# Patient Record
Sex: MALE | Race: WHITE | NOT HISPANIC OR LATINO | Employment: OTHER | URBAN - METROPOLITAN AREA
[De-identification: names, ages, dates, MRNs, and addresses within clinical notes are randomized per-mention and may not be internally consistent; named-entity substitution may affect disease eponyms.]

---

## 2023-07-12 ENCOUNTER — APPOINTMENT (OUTPATIENT)
Dept: LAB | Facility: LAB | Age: 68
End: 2023-07-12
Payer: MEDICARE

## 2023-07-12 LAB
ALANINE AMINOTRANSFERASE (SGPT) (U/L) IN SER/PLAS: 16 U/L (ref 10–52)
ALBUMIN (G/DL) IN SER/PLAS: 3.8 G/DL (ref 3.4–5)
ALKALINE PHOSPHATASE (U/L) IN SER/PLAS: 63 U/L (ref 33–136)
ANION GAP IN SER/PLAS: 11 MMOL/L (ref 10–20)
ASPARTATE AMINOTRANSFERASE (SGOT) (U/L) IN SER/PLAS: 20 U/L (ref 9–39)
BILIRUBIN TOTAL (MG/DL) IN SER/PLAS: 1.4 MG/DL (ref 0–1.2)
CALCIUM (MG/DL) IN SER/PLAS: 8.6 MG/DL (ref 8.6–10.3)
CARBON DIOXIDE, TOTAL (MMOL/L) IN SER/PLAS: 27 MMOL/L (ref 21–32)
CHLORIDE (MMOL/L) IN SER/PLAS: 105 MMOL/L (ref 98–107)
CHOLESTEROL (MG/DL) IN SER/PLAS: 174 MG/DL (ref 0–199)
CHOLESTEROL IN HDL (MG/DL) IN SER/PLAS: 71.3 MG/DL
CHOLESTEROL/HDL RATIO: 2.4
CREATININE (MG/DL) IN SER/PLAS: 0.94 MG/DL (ref 0.5–1.3)
ERYTHROCYTE DISTRIBUTION WIDTH (RATIO) BY AUTOMATED COUNT: 13.4 % (ref 11.5–14.5)
ERYTHROCYTE MEAN CORPUSCULAR HEMOGLOBIN CONCENTRATION (G/DL) BY AUTOMATED: 34 G/DL (ref 32–36)
ERYTHROCYTE MEAN CORPUSCULAR VOLUME (FL) BY AUTOMATED COUNT: 98 FL (ref 80–100)
ERYTHROCYTES (10*6/UL) IN BLOOD BY AUTOMATED COUNT: 4.51 X10E12/L (ref 4.5–5.9)
ESTIMATED AVERAGE GLUCOSE FOR HBA1C: 103 MG/DL
GFR MALE: 89 ML/MIN/1.73M2
GLUCOSE (MG/DL) IN SER/PLAS: 91 MG/DL (ref 74–99)
HEMATOCRIT (%) IN BLOOD BY AUTOMATED COUNT: 44.1 % (ref 41–52)
HEMOGLOBIN (G/DL) IN BLOOD: 15 G/DL (ref 13.5–17.5)
HEMOGLOBIN A1C/HEMOGLOBIN TOTAL IN BLOOD: 5.2 %
LDL: 82 MG/DL (ref 0–99)
LEUKOCYTES (10*3/UL) IN BLOOD BY AUTOMATED COUNT: 3.1 X10E9/L (ref 4.4–11.3)
PLATELETS (10*3/UL) IN BLOOD AUTOMATED COUNT: 160 X10E9/L (ref 150–450)
POTASSIUM (MMOL/L) IN SER/PLAS: 3.4 MMOL/L (ref 3.5–5.3)
PROSTATE SPECIFIC AG (NG/ML) IN SER/PLAS: 0.4 NG/ML (ref 0–4)
PROTEIN TOTAL: 6.6 G/DL (ref 6.4–8.2)
SODIUM (MMOL/L) IN SER/PLAS: 140 MMOL/L (ref 136–145)
THYROTROPIN (MIU/L) IN SER/PLAS BY DETECTION LIMIT <= 0.05 MIU/L: 2.16 MIU/L (ref 0.44–3.98)
TRIGLYCERIDE (MG/DL) IN SER/PLAS: 104 MG/DL (ref 0–149)
UREA NITROGEN (MG/DL) IN SER/PLAS: 14 MG/DL (ref 6–23)
VLDL: 21 MG/DL (ref 0–40)

## 2023-07-19 LAB
ALANINE AMINOTRANSFERASE (SGPT) (U/L) IN SER/PLAS: 16 U/L (ref 10–52)
ALBUMIN (G/DL) IN SER/PLAS: 3.9 G/DL (ref 3.4–5)
ALKALINE PHOSPHATASE (U/L) IN SER/PLAS: 52 U/L (ref 33–136)
ANION GAP IN SER/PLAS: 11 MMOL/L (ref 10–20)
ASPARTATE AMINOTRANSFERASE (SGOT) (U/L) IN SER/PLAS: 22 U/L (ref 9–39)
BASOPHILS (10*3/UL) IN BLOOD BY AUTOMATED COUNT: 0.08 X10E9/L (ref 0–0.1)
BASOPHILS/100 LEUKOCYTES IN BLOOD BY AUTOMATED COUNT: 2.3 % (ref 0–2)
BILIRUBIN TOTAL (MG/DL) IN SER/PLAS: 0.8 MG/DL (ref 0–1.2)
CALCIUM (MG/DL) IN SER/PLAS: 8.7 MG/DL (ref 8.6–10.3)
CARBON DIOXIDE, TOTAL (MMOL/L) IN SER/PLAS: 29 MMOL/L (ref 21–32)
CHLORIDE (MMOL/L) IN SER/PLAS: 104 MMOL/L (ref 98–107)
CREATININE (MG/DL) IN SER/PLAS: 0.86 MG/DL (ref 0.5–1.3)
EOSINOPHILS (10*3/UL) IN BLOOD BY AUTOMATED COUNT: 0.08 X10E9/L (ref 0–0.7)
EOSINOPHILS/100 LEUKOCYTES IN BLOOD BY AUTOMATED COUNT: 2.3 % (ref 0–6)
ERYTHROCYTE DISTRIBUTION WIDTH (RATIO) BY AUTOMATED COUNT: 13.1 % (ref 11.5–14.5)
ERYTHROCYTE MEAN CORPUSCULAR HEMOGLOBIN CONCENTRATION (G/DL) BY AUTOMATED: 33.6 G/DL (ref 32–36)
ERYTHROCYTE MEAN CORPUSCULAR VOLUME (FL) BY AUTOMATED COUNT: 97 FL (ref 80–100)
ERYTHROCYTES (10*6/UL) IN BLOOD BY AUTOMATED COUNT: 4.39 X10E12/L (ref 4.5–5.9)
GFR MALE: >90 ML/MIN/1.73M2
GLUCOSE (MG/DL) IN SER/PLAS: 100 MG/DL (ref 74–99)
HEMATOCRIT (%) IN BLOOD BY AUTOMATED COUNT: 42.8 % (ref 41–52)
HEMOGLOBIN (G/DL) IN BLOOD: 14.4 G/DL (ref 13.5–17.5)
IMMATURE GRANULOCYTES/100 LEUKOCYTES IN BLOOD BY AUTOMATED COUNT: 0.3 % (ref 0–0.9)
LEUKOCYTES (10*3/UL) IN BLOOD BY AUTOMATED COUNT: 3.6 X10E9/L (ref 4.4–11.3)
LYMPHOCYTES (10*3/UL) IN BLOOD BY AUTOMATED COUNT: 0.74 X10E9/L (ref 1.2–4.8)
LYMPHOCYTES/100 LEUKOCYTES IN BLOOD BY AUTOMATED COUNT: 20.8 % (ref 13–44)
MONOCYTES (10*3/UL) IN BLOOD BY AUTOMATED COUNT: 0.48 X10E9/L (ref 0.1–1)
MONOCYTES/100 LEUKOCYTES IN BLOOD BY AUTOMATED COUNT: 13.5 % (ref 2–10)
NEUTROPHILS (10*3/UL) IN BLOOD BY AUTOMATED COUNT: 2.16 X10E9/L (ref 1.2–7.7)
NEUTROPHILS/100 LEUKOCYTES IN BLOOD BY AUTOMATED COUNT: 60.8 % (ref 40–80)
PLATELETS (10*3/UL) IN BLOOD AUTOMATED COUNT: 127 X10E9/L (ref 150–450)
POTASSIUM (MMOL/L) IN SER/PLAS: 3.5 MMOL/L (ref 3.5–5.3)
PROTEIN TOTAL: 6.7 G/DL (ref 6.4–8.2)
SODIUM (MMOL/L) IN SER/PLAS: 140 MMOL/L (ref 136–145)
UREA NITROGEN (MG/DL) IN SER/PLAS: 13 MG/DL (ref 6–23)

## 2023-07-20 LAB
IGA (MG/DL) IN SER/PLAS: 67 MG/DL (ref 70–400)
IGG (MG/DL) IN SER/PLAS: 1860 MG/DL (ref 700–1600)
IGM (MG/DL) IN SER/PLAS: 40 MG/DL (ref 40–230)
IMMUNOGLOBULIN LIGHT CHAINS KAPPA/LAMBDA (MASS RATIO) IN SERUM: 1.66 (ref 0.26–1.65)
IMMUNOGLOBULIN LIGHT CHAINS.KAPPA (MG/DL) IN SERUM: 1.84 MG/DL (ref 0.33–1.94)
IMMUNOGLOBULIN LIGHT CHAINS.LAMBDA (MG/DL) IN SERUM: 1.11 MG/DL (ref 0.57–2.63)

## 2023-07-23 LAB
ALBUMIN ELP: 3.9 G/DL (ref 3.4–5)
ALPHA 1: 0.2 G/DL (ref 0.2–0.6)
ALPHA 2: 0.5 G/DL (ref 0.4–1.1)
BETA: 0.5 G/DL (ref 0.5–1.2)
GAMMA GLOBULIN: 1.6 G/DL (ref 0.5–1.4)
M-PROTEIN 1: 1 G/DL
PATH REVIEW-SERUM PROTEIN ELECTROPHORESIS: NORMAL
PROTEIN ELECTROPHORESIS INTERPRETATION: ABNORMAL
PROTEIN TOTAL: 6.7 G/DL (ref 6.4–8.2)

## 2023-07-24 LAB
ALANINE AMINOTRANSFERASE (SGPT) (U/L) IN SER/PLAS: NORMAL
ALBUMIN (G/DL) IN SER/PLAS: NORMAL
ALBUMIN ELP: NORMAL
ALKALINE PHOSPHATASE (U/L) IN SER/PLAS: NORMAL
ALPHA 1: NORMAL
ALPHA 2: NORMAL
ANION GAP IN SER/PLAS: NORMAL
ASPARTATE AMINOTRANSFERASE (SGOT) (U/L) IN SER/PLAS: NORMAL
BASOPHILS (10*3/UL) IN BLOOD BY AUTOMATED COUNT: NORMAL
BASOPHILS/100 LEUKOCYTES IN BLOOD BY AUTOMATED COUNT: NORMAL
BETA: NORMAL
BILIRUBIN TOTAL (MG/DL) IN SER/PLAS: NORMAL
CALCIUM (MG/DL) IN SER/PLAS: NORMAL
CARBON DIOXIDE, TOTAL (MMOL/L) IN SER/PLAS: NORMAL
CHLORIDE (MMOL/L) IN SER/PLAS: NORMAL
CREATININE (MG/DL) IN SER/PLAS: NORMAL
EOSINOPHILS (10*3/UL) IN BLOOD BY AUTOMATED COUNT: NORMAL
EOSINOPHILS/100 LEUKOCYTES IN BLOOD BY AUTOMATED COUNT: NORMAL
ERYTHROCYTE DISTRIBUTION WIDTH (RATIO) BY AUTOMATED COUNT: NORMAL
ERYTHROCYTE MEAN CORPUSCULAR HEMOGLOBIN CONCENTRATION (G/DL) BY AUTOMATED: NORMAL
ERYTHROCYTE MEAN CORPUSCULAR VOLUME (FL) BY AUTOMATED COUNT: NORMAL
ERYTHROCYTES (10*6/UL) IN BLOOD BY AUTOMATED COUNT: NORMAL
GAMMA GLOBULIN: NORMAL
GFR FEMALE: NORMAL
GFR MALE: NORMAL
GLUCOSE (MG/DL) IN SER/PLAS: NORMAL
HEMATOCRIT (%) IN BLOOD BY AUTOMATED COUNT: NORMAL
HEMOGLOBIN (G/DL) IN BLOOD: NORMAL
IMMATURE GRANULOCYTES/100 LEUKOCYTES IN BLOOD BY AUTOMATED COUNT: NORMAL
IMMUNOGLOBULIN LIGHT CHAINS KAPPA/LAMBDA (MASS RATIO) IN SERUM: NORMAL
IMMUNOGLOBULIN LIGHT CHAINS.KAPPA (MG/DL) IN SERUM: NORMAL
IMMUNOGLOBULIN LIGHT CHAINS.LAMBDA (MG/DL) IN SERUM: NORMAL
LEUKOCYTES (10*3/UL) IN BLOOD BY AUTOMATED COUNT: NORMAL
LYMPHOCYTES (10*3/UL) IN BLOOD BY AUTOMATED COUNT: NORMAL
LYMPHOCYTES/100 LEUKOCYTES IN BLOOD BY AUTOMATED COUNT: NORMAL
MANUAL DIFFERENTIAL Y/N: NORMAL
MONOCYTES (10*3/UL) IN BLOOD BY AUTOMATED COUNT: NORMAL
MONOCYTES/100 LEUKOCYTES IN BLOOD BY AUTOMATED COUNT: NORMAL
NEUTROPHILS (10*3/UL) IN BLOOD BY AUTOMATED COUNT: NORMAL
NEUTROPHILS/100 LEUKOCYTES IN BLOOD BY AUTOMATED COUNT: NORMAL
NRBC (PER 100 WBCS) BY AUTOMATED COUNT: NORMAL
PATH REVIEW-SERUM PROTEIN ELECTROPHORESIS: NORMAL
PLATELETS (10*3/UL) IN BLOOD AUTOMATED COUNT: NORMAL
POTASSIUM (MMOL/L) IN SER/PLAS: NORMAL
PROTEIN ELECTROPHORESIS INTERPRETATION: NORMAL
PROTEIN TOTAL: NORMAL
PROTEIN TOTAL: NORMAL
SODIUM (MMOL/L) IN SER/PLAS: NORMAL
UREA NITROGEN (MG/DL) IN SER/PLAS: NORMAL

## 2023-07-26 LAB
C. DIFFICILE TOXIN, PCR: NORMAL
CAMPYLOBACTER GP: NORMAL
CLOSTRIDIUM DIFFICILE NAP 1 STRAIN (PRESUMPTIVE): NORMAL
NOROVIRUS GI/GII: NORMAL
ROTAVIRUS A: NORMAL
SALMONELLA SP.: NORMAL
SHIGA TOXIN 1: NORMAL
SHIGA TOXIN 2: NORMAL
SHIGELLA SP.: NORMAL
VIBRIO GRP.: NORMAL
YERSINIA ENTEROCOLITICA: NORMAL

## 2023-10-16 ENCOUNTER — TELEPHONE (OUTPATIENT)
Dept: HEMATOLOGY/ONCOLOGY | Facility: CLINIC | Age: 68
End: 2023-10-16
Payer: MEDICARE

## 2023-10-16 DIAGNOSIS — C90.00 MULTIPLE MYELOMA NOT HAVING ACHIEVED REMISSION (MULTI): Primary | ICD-10-CM

## 2023-10-16 RX ORDER — LENALIDOMIDE 5 MG/1
5 CAPSULE ORAL DAILY
COMMUNITY
Start: 2023-09-20 | End: 2023-10-16 | Stop reason: SDUPTHER

## 2023-10-16 RX ORDER — OMEPRAZOLE 20 MG/1
20 TABLET, DELAYED RELEASE ORAL
Qty: 30 TABLET | Refills: 3 | Status: SHIPPED | OUTPATIENT
Start: 2023-10-16 | End: 2024-01-12

## 2023-10-16 RX ORDER — OMEPRAZOLE 20 MG/1
20 TABLET, DELAYED RELEASE ORAL
COMMUNITY
End: 2023-10-16 | Stop reason: SDUPTHER

## 2023-10-16 RX ORDER — LENALIDOMIDE 5 MG/1
5 CAPSULE ORAL DAILY
Qty: 21 CAPSULE | Refills: 0 | Status: SHIPPED | OUTPATIENT
Start: 2023-10-16 | End: 2023-11-10 | Stop reason: SDUPTHER

## 2023-11-08 ENCOUNTER — TELEPHONE (OUTPATIENT)
Dept: HEMATOLOGY/ONCOLOGY | Facility: CLINIC | Age: 68
End: 2023-11-08
Payer: MEDICARE

## 2023-11-08 DIAGNOSIS — C90.00 MULTIPLE MYELOMA NOT HAVING ACHIEVED REMISSION (MULTI): ICD-10-CM

## 2023-11-08 NOTE — TELEPHONE ENCOUNTER
Pt called and told he is due to have survey on Friday and that we will do then.  He states he just wanted to get the process going.  Confirmed that this was appreciated and glad he called.

## 2023-11-08 NOTE — TELEPHONE ENCOUNTER
[facility] line called and pt is due for Survey on 11/10.  This will need to be done under Dr. Gomez.  This can be done with their assistance on the [facility] line.

## 2023-11-10 RX ORDER — LENALIDOMIDE 5 MG/1
5 CAPSULE ORAL DAILY
Qty: 21 CAPSULE | Refills: 0 | Status: SHIPPED | OUTPATIENT
Start: 2023-11-10 | End: 2023-12-01

## 2023-11-10 NOTE — TELEPHONE ENCOUNTER
[facility] center called and account under Dr. Gomez.  [facility] survey done, authorization number #[id number].

## 2023-11-15 PROBLEM — D22.5 MELANOCYTIC NEVI OF TRUNK: Status: ACTIVE | Noted: 2017-04-04

## 2023-11-15 PROBLEM — E87.6 HYPOKALEMIA: Status: ACTIVE | Noted: 2023-11-15

## 2023-11-15 PROBLEM — L57.0 ACTINIC KERATOSIS: Status: ACTIVE | Noted: 2017-04-04

## 2023-11-15 PROBLEM — L81.4 OTHER MELANIN HYPERPIGMENTATION: Status: ACTIVE | Noted: 2017-04-04

## 2023-11-15 PROBLEM — R09.02 HYPOXIA: Status: ACTIVE | Noted: 2023-11-15

## 2023-11-15 PROBLEM — Z85.828 PERSONAL HISTORY OF OTHER MALIGNANT NEOPLASM OF SKIN: Status: ACTIVE | Noted: 2017-04-04

## 2023-11-15 PROBLEM — Z86.19 HISTORY OF HERPES ZOSTER: Status: ACTIVE | Noted: 2023-11-15

## 2023-11-15 PROBLEM — C80.1 MALIGNANT NEOPLASM (MULTI): Status: ACTIVE | Noted: 2023-11-15

## 2023-11-15 PROBLEM — J18.9 PNEUMONIA: Status: ACTIVE | Noted: 2023-11-15

## 2023-11-15 PROBLEM — L85.3 XEROSIS CUTIS: Status: ACTIVE | Noted: 2017-04-04

## 2023-11-15 PROBLEM — J96.00 ACUTE RESPIRATORY FAILURE (MULTI): Status: ACTIVE | Noted: 2023-11-15

## 2023-11-15 PROBLEM — M54.16 LUMBAR RADICULITIS: Status: ACTIVE | Noted: 2022-06-15

## 2023-11-15 PROBLEM — M79.609 PAIN IN EXTREMITY: Status: ACTIVE | Noted: 2023-11-15

## 2023-11-15 PROBLEM — Z86.79 HISTORY OF HYPERTENSION: Status: ACTIVE | Noted: 2023-11-15

## 2023-11-15 PROBLEM — I10 ESSENTIAL (PRIMARY) HYPERTENSION: Status: ACTIVE | Noted: 2022-03-16

## 2023-11-15 PROBLEM — L82.1 SEBORRHEIC KERATOSIS: Status: ACTIVE | Noted: 2017-04-04

## 2023-11-15 PROBLEM — D18.00 ANGIOMA: Status: ACTIVE | Noted: 2017-04-04

## 2023-11-15 PROBLEM — M51.26 LUMBAR DISC HERNIATION: Status: ACTIVE | Noted: 2023-11-15

## 2023-11-15 RX ORDER — AMLODIPINE BESYLATE 10 MG/1
10 TABLET ORAL DAILY
COMMUNITY
End: 2024-06-10

## 2023-11-15 RX ORDER — ASPIRIN 81 MG/1
81 TABLET ORAL DAILY
COMMUNITY

## 2023-11-15 RX ORDER — CEPHALEXIN 500 MG/1
500 CAPSULE ORAL 2 TIMES DAILY
COMMUNITY
Start: 2023-10-02 | End: 2023-11-27 | Stop reason: ALTCHOICE

## 2023-11-15 RX ORDER — AMMONIUM LACTATE 12 G/100G
CREAM TOPICAL
COMMUNITY
Start: 2014-10-21 | End: 2023-11-27 | Stop reason: ALTCHOICE

## 2023-11-15 RX ORDER — POTASSIUM CHLORIDE 20 MEQ/1
20 TABLET, EXTENDED RELEASE ORAL
COMMUNITY
Start: 2017-11-11 | End: 2023-11-24

## 2023-11-15 RX ORDER — LENALIDOMIDE 10 MG/1
10 CAPSULE ORAL DAILY
COMMUNITY
Start: 2022-03-01 | End: 2023-12-08 | Stop reason: SDUPTHER

## 2023-11-15 RX ORDER — FINASTERIDE 5 MG/1
5 TABLET, FILM COATED ORAL DAILY
COMMUNITY

## 2023-11-15 RX ORDER — IBUPROFEN 800 MG/1
800 TABLET ORAL EVERY 6 HOURS PRN
COMMUNITY

## 2023-11-15 RX ORDER — DIPHENHYDRAMINE HCL 25 MG
25 TABLET ORAL DAILY PRN
COMMUNITY

## 2023-11-15 RX ORDER — MELOXICAM 7.5 MG/1
7.5 TABLET ORAL
COMMUNITY
Start: 2017-12-22

## 2023-11-15 RX ORDER — LOSARTAN POTASSIUM 100 MG/1
100 TABLET ORAL
COMMUNITY
Start: 2022-03-07 | End: 2024-03-07

## 2023-11-15 RX ORDER — LEVOTHYROXINE SODIUM 25 UG/1
25 TABLET ORAL DAILY
COMMUNITY
End: 2024-02-22

## 2023-11-15 RX ORDER — CHLORHEXIDINE GLUCONATE ORAL RINSE 1.2 MG/ML
SOLUTION DENTAL
COMMUNITY
Start: 2023-10-02 | End: 2023-11-27 | Stop reason: ALTCHOICE

## 2023-11-15 RX ORDER — HYDROCHLOROTHIAZIDE 25 MG/1
25 TABLET ORAL DAILY
COMMUNITY
End: 2023-12-20

## 2023-11-15 RX ORDER — OMEPRAZOLE 20 MG/1
20 CAPSULE, DELAYED RELEASE ORAL
COMMUNITY

## 2023-11-21 ENCOUNTER — LAB (OUTPATIENT)
Dept: LAB | Facility: LAB | Age: 68
End: 2023-11-21
Payer: MEDICARE

## 2023-11-21 DIAGNOSIS — C90.01 MULTIPLE MYELOMA IN REMISSION (MULTI): ICD-10-CM

## 2023-11-21 LAB
ALBUMIN SERPL BCP-MCNC: 4.2 G/DL (ref 3.4–5)
ALP SERPL-CCNC: 64 U/L (ref 33–136)
ALT SERPL W P-5'-P-CCNC: 17 U/L (ref 10–52)
ANION GAP SERPL CALC-SCNC: 12 MMOL/L (ref 10–20)
AST SERPL W P-5'-P-CCNC: 22 U/L (ref 9–39)
BASOPHILS # BLD AUTO: 0.1 X10*3/UL (ref 0–0.1)
BASOPHILS NFR BLD AUTO: 2.4 %
BILIRUB SERPL-MCNC: 1.1 MG/DL (ref 0–1.2)
BUN SERPL-MCNC: 14 MG/DL (ref 6–23)
CALCIUM SERPL-MCNC: 8.8 MG/DL (ref 8.6–10.3)
CHLORIDE SERPL-SCNC: 102 MMOL/L (ref 98–107)
CO2 SERPL-SCNC: 29 MMOL/L (ref 21–32)
CREAT SERPL-MCNC: 0.92 MG/DL (ref 0.5–1.3)
EOSINOPHIL # BLD AUTO: 0.11 X10*3/UL (ref 0–0.7)
EOSINOPHIL NFR BLD AUTO: 2.7 %
ERYTHROCYTE [DISTWIDTH] IN BLOOD BY AUTOMATED COUNT: 13.4 % (ref 11.5–14.5)
GFR SERPL CREATININE-BSD FRML MDRD: >90 ML/MIN/1.73M*2
GLUCOSE SERPL-MCNC: 75 MG/DL (ref 74–99)
HCT VFR BLD AUTO: 45.9 % (ref 41–52)
HGB BLD-MCNC: 15.3 G/DL (ref 13.5–17.5)
IMM GRANULOCYTES # BLD AUTO: 0.01 X10*3/UL (ref 0–0.7)
IMM GRANULOCYTES NFR BLD AUTO: 0.2 % (ref 0–0.9)
LYMPHOCYTES # BLD AUTO: 0.97 X10*3/UL (ref 1.2–4.8)
LYMPHOCYTES NFR BLD AUTO: 23.4 %
MCH RBC QN AUTO: 33.2 PG (ref 26–34)
MCHC RBC AUTO-ENTMCNC: 33.3 G/DL (ref 32–36)
MCV RBC AUTO: 100 FL (ref 80–100)
MONOCYTES # BLD AUTO: 0.63 X10*3/UL (ref 0.1–1)
MONOCYTES NFR BLD AUTO: 15.2 %
NEUTROPHILS # BLD AUTO: 2.33 X10*3/UL (ref 1.2–7.7)
NEUTROPHILS NFR BLD AUTO: 56.1 %
NRBC BLD-RTO: 0 /100 WBCS (ref 0–0)
PLATELET # BLD AUTO: 167 X10*3/UL (ref 150–450)
POTASSIUM SERPL-SCNC: 3.9 MMOL/L (ref 3.5–5.3)
PROT SERPL-MCNC: 6.9 G/DL (ref 6.4–8.2)
RBC # BLD AUTO: 4.61 X10*6/UL (ref 4.5–5.9)
SODIUM SERPL-SCNC: 139 MMOL/L (ref 136–145)
WBC # BLD AUTO: 4.2 X10*3/UL (ref 4.4–11.3)

## 2023-11-21 PROCEDURE — 80053 COMPREHEN METABOLIC PANEL: CPT

## 2023-11-21 PROCEDURE — 36415 COLL VENOUS BLD VENIPUNCTURE: CPT

## 2023-11-21 PROCEDURE — 85025 COMPLETE CBC W/AUTO DIFF WBC: CPT

## 2023-11-21 PROCEDURE — 84165 PROTEIN E-PHORESIS SERUM: CPT

## 2023-11-21 PROCEDURE — 84155 ASSAY OF PROTEIN SERUM: CPT

## 2023-11-21 PROCEDURE — 83521 IG LIGHT CHAINS FREE EACH: CPT

## 2023-11-22 LAB
KAPPA LC SERPL-MCNC: 1.75 MG/DL (ref 0.33–1.94)
KAPPA LC/LAMBDA SER: 1.73 {RATIO} (ref 0.26–1.65)
LAMBDA LC SERPL-MCNC: 1.01 MG/DL (ref 0.57–2.63)
PROT SERPL-MCNC: 7.2 G/DL (ref 6.4–8.2)

## 2023-11-23 DIAGNOSIS — E87.6 HYPOKALEMIA: ICD-10-CM

## 2023-11-23 LAB
ALBUMIN: 4.2 G/DL (ref 3.4–5)
ALPHA 1 GLOBULIN: 0.3 G/DL (ref 0.2–0.6)
ALPHA 2 GLOBULIN: 0.5 G/DL (ref 0.4–1.1)
BETA GLOBULIN: 0.6 G/DL (ref 0.5–1.2)
GAMMA GLOBULIN: 1.7 G/DL (ref 0.5–1.4)
M-PROTEIN 1: 1 G/DL
PATH REVIEW-SERUM PROTEIN ELECTROPHORESIS: ABNORMAL
PROTEIN ELECTROPHORESIS COMMENT: ABNORMAL

## 2023-11-24 RX ORDER — POTASSIUM CHLORIDE 1500 MG/1
30 TABLET, EXTENDED RELEASE ORAL DAILY
Qty: 45 TABLET | Refills: 11 | Status: SHIPPED | OUTPATIENT
Start: 2023-11-24

## 2023-11-27 ENCOUNTER — OFFICE VISIT (OUTPATIENT)
Dept: HEMATOLOGY/ONCOLOGY | Facility: CLINIC | Age: 68
End: 2023-11-27
Payer: MEDICARE

## 2023-11-27 VITALS
OXYGEN SATURATION: 95 % | DIASTOLIC BLOOD PRESSURE: 78 MMHG | WEIGHT: 219.8 LBS | SYSTOLIC BLOOD PRESSURE: 129 MMHG | TEMPERATURE: 98.2 F | RESPIRATION RATE: 17 BRPM | BODY MASS INDEX: 29.77 KG/M2 | HEART RATE: 59 BPM | HEIGHT: 72 IN

## 2023-11-27 DIAGNOSIS — C90.01 MULTIPLE MYELOMA IN REMISSION (MULTI): ICD-10-CM

## 2023-11-27 PROCEDURE — 1159F MED LIST DOCD IN RCRD: CPT | Performed by: PHYSICIAN ASSISTANT

## 2023-11-27 PROCEDURE — 3078F DIAST BP <80 MM HG: CPT | Performed by: PHYSICIAN ASSISTANT

## 2023-11-27 PROCEDURE — 1126F AMNT PAIN NOTED NONE PRSNT: CPT | Performed by: PHYSICIAN ASSISTANT

## 2023-11-27 PROCEDURE — 3074F SYST BP LT 130 MM HG: CPT | Performed by: PHYSICIAN ASSISTANT

## 2023-11-27 PROCEDURE — 1036F TOBACCO NON-USER: CPT | Performed by: PHYSICIAN ASSISTANT

## 2023-11-27 PROCEDURE — 99214 OFFICE O/P EST MOD 30 MIN: CPT | Performed by: PHYSICIAN ASSISTANT

## 2023-11-27 PROCEDURE — 99214 OFFICE O/P EST MOD 30 MIN: CPT | Mod: PO | Performed by: PHYSICIAN ASSISTANT

## 2023-11-27 ASSESSMENT — COLUMBIA-SUICIDE SEVERITY RATING SCALE - C-SSRS
6. HAVE YOU EVER DONE ANYTHING, STARTED TO DO ANYTHING, OR PREPARED TO DO ANYTHING TO END YOUR LIFE?: NO
1. IN THE PAST MONTH, HAVE YOU WISHED YOU WERE DEAD OR WISHED YOU COULD GO TO SLEEP AND NOT WAKE UP?: NO
2. HAVE YOU ACTUALLY HAD ANY THOUGHTS OF KILLING YOURSELF?: NO

## 2023-11-27 ASSESSMENT — PATIENT HEALTH QUESTIONNAIRE - PHQ9
1. LITTLE INTEREST OR PLEASURE IN DOING THINGS: NOT AT ALL
2. FEELING DOWN, DEPRESSED OR HOPELESS: NOT AT ALL
SUM OF ALL RESPONSES TO PHQ9 QUESTIONS 1 AND 2: 0

## 2023-11-27 ASSESSMENT — ENCOUNTER SYMPTOMS
DEPRESSION: 0
LOSS OF SENSATION IN FEET: 0
OCCASIONAL FEELINGS OF UNSTEADINESS: 0

## 2023-11-27 ASSESSMENT — PAIN SCALES - GENERAL: PAINLEVEL: 0-NO PAIN

## 2023-11-27 NOTE — PROGRESS NOTES
Patient Visit Information:   Visit Type: New Visit, Follow Up Visit     Cancer History:    Multiple Myeloma/Plasmacytoma   AJCC Edition: 7th, Diagnosis Date: 01-Oct-2012, Stage 1A,        Treatment Synopsis:    developed PNA in 6/2018 s/p augmentin for total 7 day course. off revlimid for a few weeks and was restarted in late June 2018 with lower dose 10 mg 3 weeks on one week off.     had URI in 4/2019 with cough resolved with 2 courses of abx.     c/o intermittent diarrhea improved from before, imodium helped. c/o stable to improved tolerated numbness of feet. appetite is good. denies SOB and CP, fever or chills.    has been taking KCL 30 meq daily.    Patient with S[place] multiple myeloma, IgG lambda, diagnosed around 2011, felt to be a high risk of progression into multiple myeloma. started on revlimid and decadron, off decadron since 12/9/14. M spike began to increase. responded well with resuming decadron since 3/3/15. dose of revlimid was increased to 15 mg given increased M protein since March 2015. tapered decadron to 8 mg weekly, then off in 9/2017 due to concerns for infection. PNA in 6/2018 dose of revlimid was down to 10 mg daily 3 weeks on one week off. M protein has slowly increasing to 1.33 g/dL on 2/19/19 and 1.36 g/dL on 5/14/19, 1.7 g/dL on 11/6/19 and 1.3 g/dL on 1/8/2020. m protein down to 1.3 from 1.6 in 4/2020 and stable since then.    11/28/22: first virtual visit with me today. His Revlimid dosage was dropped from 10 mg, 3 weeks on one week off to 5 mg every day, 3 weeks on one week off. His markers has been stable. On assessment, he is doing well. Energy and appetite is stable. Peripheral neuropathy stable/improved. He denies fever, chills, night sweats, anorexia, weight loss, CP, SOB, abd pain, n/v/d, bleeding, and any other complaints at this time.     Interval History:  Patient presents for follow up. Overall doing well. On Revlimid 5 mg daily, tolerating well aside from intermittent  diarrhea. Otherwise no new complaints.     All of the systems have been reviewed and are negative for complaints except what is stated in the HPI and/or past medical history.    Allergies and Intolerances:       Allergies:   No Known Allergies: Active    Outpatient Medication Profile:  * Patient Currently Takes Medications as of 24-Jul-2023 09:13 documented in Structured Notes   Lenalidomide 5 mg oral capsule: 1 cap(s) orally once a day for 21 days on,then 7 days off, repeat in 28-day cycle      Adult Male   Auth number: [id number]      , Start Date: 23-Jun-2023   Klor-Con M20 oral tablet, extended release: 1.5 tab(s) orally once a day , Start Date: 30-May-2023   omeprazole 20 mg oral delayed release capsule: 1 cap(s) orally once a day , Start Date: 17-Aug-2022   aspirin 81 mg oral tablet: 1 tab(s) orally once a day   multivitamin:   once a day   finasteride 5 mg oral tablet: 1 tab(s) orally once a day   levothyroxine 25 mcg (0.025 mg) oral tablet: 1 tab(s) orally once a day   hydroCHLOROthiazide 25 mg oral tablet: 1 tab(s) orally once a day   amLODIPine 10 mg oral tablet: 1 tab(s) orally once a day           Medical History:   GERD (gastroesophageal reflux disease): ICD-10: K21.9, Status: Active    Family History: No Family History items are recorded in the problem list.     Social History:   Social Substance History:  · Smoking Status never smoker (1)         Vitals and Measurements:   Vitals: Temp: 36.8  HR: 52  RR: 17  BP: 128/73  SPO2%:  95   Measurements: HT(cm): 180.2  WT(kg): 97.3  BSA: 2.2  BMI:  29.9     Physical Exam:     Constitutional: awake/alert/oriented x3, no distress, alert and cooperative   Eyes: PERRL, EOMI, clear sclera   ENMT: mucous membranes moist, no apparent injury, no lesions seen   Head/Neck: Neck supple, no apparent injury, thyroid without mass or tenderness, No JVD, trachea midline, no bruits   Extremities: no LE edema,     Lab on 11/21/2023   Component Date Value Ref Range Status     Glucose 11/21/2023 75  74 - 99 mg/dL Final    Sodium 11/21/2023 139  136 - 145 mmol/L Final    Potassium 11/21/2023 3.9  3.5 - 5.3 mmol/L Final    Chloride 11/21/2023 102  98 - 107 mmol/L Final    Bicarbonate 11/21/2023 29  21 - 32 mmol/L Final    Anion Gap 11/21/2023 12  10 - 20 mmol/L Final    Urea Nitrogen 11/21/2023 14  6 - 23 mg/dL Final    Creatinine 11/21/2023 0.92  0.50 - 1.30 mg/dL Final    eGFR 11/21/2023 >90  >60 mL/min/1.73m*2 Final    Calculations of estimated GFR are performed using the 2021 CKD-EPI Study Refit equation without the race variable for the IDMS-Traceable creatinine methods.  https://jasn.asnjournals.org/content/early/2021/09/22/ASN.7496133689    Calcium 11/21/2023 8.8  8.6 - 10.3 mg/dL Final    Albumin 11/21/2023 4.2  3.4 - 5.0 g/dL Final    Alkaline Phosphatase 11/21/2023 64  33 - 136 U/L Final    Total Protein 11/21/2023 6.9  6.4 - 8.2 g/dL Final    AST 11/21/2023 22  9 - 39 U/L Final    Bilirubin, Total 11/21/2023 1.1  0.0 - 1.2 mg/dL Final    ALT 11/21/2023 17  10 - 52 U/L Final    Patients treated with Sulfasalazine may generate falsely decreased results for ALT.    WBC 11/21/2023 4.2 (L)  4.4 - 11.3 x10*3/uL Final    nRBC 11/21/2023 0.0  0.0 - 0.0 /100 WBCs Final    RBC 11/21/2023 4.61  4.50 - 5.90 x10*6/uL Final    Hemoglobin 11/21/2023 15.3  13.5 - 17.5 g/dL Final    Hematocrit 11/21/2023 45.9  41.0 - 52.0 % Final    MCV 11/21/2023 100  80 - 100 fL Final    MCH 11/21/2023 33.2  26.0 - 34.0 pg Final    MCHC 11/21/2023 33.3  32.0 - 36.0 g/dL Final    RDW 11/21/2023 13.4  11.5 - 14.5 % Final    Platelets 11/21/2023 167  150 - 450 x10*3/uL Final    Neutrophils % 11/21/2023 56.1  40.0 - 80.0 % Final    Immature Granulocytes %, Automated 11/21/2023 0.2  0.0 - 0.9 % Final    Immature Granulocyte Count (IG) includes promyelocytes, myelocytes and metamyelocytes but does not include bands. Percent differential counts (%) should be interpreted in the context of the absolute cell counts  (cells/UL).    Lymphocytes % 11/21/2023 23.4  13.0 - 44.0 % Final    Monocytes % 11/21/2023 15.2  2.0 - 10.0 % Final    Eosinophils % 11/21/2023 2.7  0.0 - 6.0 % Final    Basophils % 11/21/2023 2.4  0.0 - 2.0 % Final    Neutrophils Absolute 11/21/2023 2.33  1.20 - 7.70 x10*3/uL Final    Percent differential counts (%) should be interpreted in the context of the absolute cell counts (cells/uL).    Immature Granulocytes Absolute, Au* 11/21/2023 0.01  0.00 - 0.70 x10*3/uL Final    Lymphocytes Absolute 11/21/2023 0.97 (L)  1.20 - 4.80 x10*3/uL Final    Monocytes Absolute 11/21/2023 0.63  0.10 - 1.00 x10*3/uL Final    Eosinophils Absolute 11/21/2023 0.11  0.00 - 0.70 x10*3/uL Final    Basophils Absolute 11/21/2023 0.10  0.00 - 0.10 x10*3/uL Final    Ig [place] Free Light Chain 11/21/2023 1.75  0.33 - 1.94 mg/dL Final    Ig Lambda Free Light Chain 11/21/2023 1.01  0.57 - 2.63 mg/dL Final    Kappa/Lambda Ratio 11/21/2023 1.73 (H)  0.26 - 1.65 Final    Total Protein 11/21/2023 7.2  6.4 - 8.2 g/dL Final    Albumin 11/21/2023 4.2  3.4 - 5.0 g/dL Final    Alpha 1 Globulin 11/21/2023 0.3  0.2 - 0.6 g/dL Final    Alpha 2 Globulin 11/21/2023 0.5  0.4 - 1.1 g/dL Final    Beta Globulin 11/21/2023 0.6  0.5 - 1.2 g/dL Final    Gamma 11/21/2023 1.7 (H)  0.5 - 1.4 g/dL Final    M-PROTEIN 1 11/21/2023 1.0 (H)    g/dL Final    Protein Electrophoresis Comment 11/21/2023 Aberrant band detected in the gamma region, consistent with the patient's known monoclonal IgG lambda at 1.0 g/dL. Unchanged from the previous analysis on 7/19/23.   Final    Path Review - Serum Protein Electr* 11/21/2023 Reviewed and approved by MANDO FARRELL on 11/23/23 at 2:20 PM.      Final   Legacy Encounter on 07/27/2023   Component Date Value Ref Range Status    Yersinia Enterocolitica 07/27/2023 NOT DETECTED  NOT DETECTED Final    Campylobacter gp. 07/27/2023 NOT DETECTED  NOT DETECTED Final    Salmonella sp. 07/27/2023 NOT DETECTED  NOT DETECTED Final    Shigella  "sp. 07/27/2023 NOT DETECTED  NOT DETECTED Final    Vibrio grp. 07/27/2023 NOT DETECTED  NOT DETECTED Final    Shiga Toxin 1 07/27/2023 NOT DETECTED  NOT DETECTED Final    Shiga Toxin 2 07/27/2023 NOT DETECTED  NOT DETECTED Final    Norovirus GI/GII 07/27/2023 NOT DETECTED  NOT DETECTED Final    Rotavirus A 07/27/2023 NOT DETECTED  NOT DETECTED Final    Comment:  The enteric PCR panel is a panel of sensitive and specific   amplified nucleic acid tests indicated as an aid in the   diagnosis of specific bacterial and viral agents of   gastrointestinal illness, in conjunction with other   clinical, laboratory, and epidemiological information.   This test is not approved for monitoring these infections.   Monitoring is available for Salmonella and Shigella   infections-request test \"Stool PCR Follow-Up (STLPF)\".   Monitoring tests are not available at this time for other   enteric agents in this panel.      C. difficile Toxin, PCR 07/27/2023 NOT DETECTED  Not Detected Final    Comment:  This assay detects the presence of the tcdB (toxin B)   gene via DNA amplification, and results should be   interpreted in the context of the patients history   and clinical findings. This test cannot be performed   on formed stools or used as a test of cure, and should   not be performed more than once per 7 days.     Orders Only on 07/24/2023   Component Date Value Ref Range Status    WBC 07/24/2023 CANCELED   Final-Edited    Result canceled by the ancillary.    nRBC 07/24/2023 CANCELED   Final-Edited    Result canceled by the ancillary.    RBC 07/24/2023 CANCELED   Final-Edited    Result canceled by the ancillary.    Hemoglobin 07/24/2023 CANCELED   Final-Edited    Result canceled by the ancillary.    Hematocrit 07/24/2023 CANCELED   Final-Edited    Result canceled by the ancillary.    MCV 07/24/2023 CANCELED   Final-Edited    Result canceled by the ancillary.    MCHC 07/24/2023 CANCELED   Final-Edited    Result canceled by the " ancillary.    Platelets 07/24/2023 CANCELED   Final-Edited    Result canceled by the ancillary.    RDW 07/24/2023 CANCELED   Final-Edited    Result canceled by the ancillary.    Neutrophils % 07/24/2023 CANCELED   Final-Edited    Result canceled by the ancillary.    Immature Granulocytes %, Automated 07/24/2023 CANCELED   Final-Edited    Comment:  Immature Granulocyte Count (IG) includes promyelocytes,    myelocytes and metamyelocytes but does not include bands.   Percent differential counts (%) should be interpreted in the   context of the absolute cell counts (cells/L).    Result canceled by the ancillary.      Lymphocytes % 07/24/2023 CANCELED   Final-Edited    Result canceled by the ancillary.    Monocytes % 07/24/2023 CANCELED   Final-Edited    Result canceled by the ancillary.    Eosinophils % 07/24/2023 CANCELED   Final-Edited    Result canceled by the ancillary.    Basophils % 07/24/2023 CANCELED   Final-Edited    Result canceled by the ancillary.    Neutrophils Absolute 07/24/2023 CANCELED   Final-Edited    Result canceled by the ancillary.    Lymphocytes Absolute 07/24/2023 CANCELED   Final-Edited    Result canceled by the ancillary.    Monocytes Absolute 07/24/2023 CANCELED   Final-Edited    Result canceled by the ancillary.    Eosinophils Absolute 07/24/2023 CANCELED   Final-Edited    Result canceled by the ancillary.    Basophils Absolute 07/24/2023 CANCELED   Final-Edited    Result canceled by the ancillary.    MANUAL DIFFERENTIAL Y/N 07/24/2023 CANCELED   Final-Edited    Result canceled by the ancillary.    Ig [place] Free Light Chain 07/24/2023 CANCELED   Final-Edited    Result canceled by the ancillary.    Ig Lambda Free Light Chain 07/24/2023 CANCELED   Final-Edited    Result canceled by the ancillary.    [place]/Lambda LC Ratio 07/24/2023 CANCELED   Final-Edited    Comment:  Undetected antigen excess is a rare event but cannot be   excluded. If these free light chain results do not agree   with other  clinical or laboratory findings, or if the   sample is from a patient that has previously demonstrated   antigen excess, the result must be checked by retesting   at a higher sample dilution. Results should always be   interpreted in conjunction with other laboratory tests   and clinical evidence; any anomalies should be discussed   with the testing laboratory.    Result canceled by the ancillary.      Glucose 07/24/2023 CANCELED   Final-Edited    Result canceled by the ancillary.    Sodium 07/24/2023 CANCELED   Final-Edited    Result canceled by the ancillary.    Potassium 07/24/2023 CANCELED   Final-Edited    Result canceled by the ancillary.    Chloride 07/24/2023 CANCELED   Final-Edited    Result canceled by the ancillary.    Bicarbonate 07/24/2023 CANCELED   Final-Edited    Result canceled by the ancillary.    Anion Gap 07/24/2023 CANCELED   Final-Edited    Result canceled by the ancillary.    Urea Nitrogen 07/24/2023 CANCELED   Final-Edited    Result canceled by the ancillary.    Creatinine 07/24/2023 CANCELED   Final-Edited    Result canceled by the ancillary.    GFR Female 07/24/2023 CANCELED   Final-Edited    Comment:  CALCULATIONS OF ESTIMATED GFR ARE PERFORMED   USING THE 2021 CKD-EPI STUDY REFIT EQUATION   WITHOUT THE RACE VARIABLE FOR THE IDMS-TRACEABLE   CREATININE METHODS.    https://jasn.asnjournals.org/content/early/2021/09/22/ASN.[email or web address]    Result canceled by the ancillary.      GFR MALE 07/24/2023 CANCELED   Final-Edited    Comment:  CALCULATIONS OF ESTIMATED GFR ARE PERFORMED   USING THE 2021 CKD-EPI STUDY REFIT EQUATION   WITHOUT THE RACE VARIABLE FOR THE IDMS-TRACEABLE   CREATININE METHODS.    https://jasn.asnjournals.org/content/early/2021/09/22/ASN.[email or web address]    Result canceled by the ancillary.      Calcium 07/24/2023 CANCELED   Final-Edited    Result canceled by the ancillary.    Albumin 07/24/2023 CANCELED   Final-Edited    Result canceled by the ancillary.    Alkaline Phosphatase  07/24/2023 CANCELED   Final-Edited    Result canceled by the ancillary.    Total Protein 07/24/2023 CANCELED   Final-Edited    Result canceled by the ancillary.    AST 07/24/2023 CANCELED   Final-Edited    Result canceled by the ancillary.    Total Bilirubin 07/24/2023 CANCELED   Final-Edited    Result canceled by the ancillary.    ALT (SGPT) 07/24/2023 CANCELED   Final-Edited    Comment:  Patients treated with Sulfasalazine may generate    falsely decreased results for ALT.    Result canceled by the ancillary.      Path Review - Serum Protein Electr* 07/24/2023 CANCELED   Final-Edited    Comment:  By her/his signature above, the Pathologist   listed as making the final interpretation   certifies that she/he has personally reviewed    this case.  ----------------------------------------------     Result canceled by the ancillary.      Total Protein 07/24/2023 CANCELED   Final-Edited    Result canceled by the ancillary.    Albumin 07/24/2023 CANCELED   Final-Edited    Result canceled by the ancillary.    Alpha 1 07/24/2023 CANCELED   Final-Edited    Result canceled by the ancillary.    Alpha 2 07/24/2023 CANCELED   Final-Edited    Result canceled by the ancillary.    Beta 07/24/2023 CANCELED   Final-Edited    Result canceled by the ancillary.    Gamma 07/24/2023 CANCELED   Final-Edited    Result canceled by the ancillary.    SPE Interpretation 07/24/2023 CANCELED   Final-Edited    Result canceled by the ancillary.    C. difficile Toxin, PCR 07/26/2023 CANCELED   Final-Edited    Comment:  This assay detects the presence of the tcdB (toxin B)   gene via DNA amplification, and results should be   interpreted in the context of the patients history   and clinical findings. This test cannot be performed   on formed stools or used as a test of cure, and should   not be performed more than once per 7 days.    Result canceled by the ancillary.      Clostridium difficile NAP 1 Strain 07/26/2023 CANCELED  Not Detected  "Final-Edited    Result canceled by the ancillary.    Yersinia Enterocolitica 07/26/2023 CANCELED   Final-Edited    Result canceled by the ancillary.    Campylobacter gp. 07/26/2023 CANCELED   Final-Edited    Result canceled by the ancillary.    Salmonella sp. 07/26/2023 CANCELED   Final-Edited    Result canceled by the ancillary.    Shigella sp. 07/26/2023 CANCELED   Final-Edited    Result canceled by the ancillary.    Vibrio grp. 07/26/2023 CANCELED   Final-Edited    Result canceled by the ancillary.    Shiga Toxin 1 07/26/2023 CANCELED   Final-Edited    Result canceled by the ancillary.    Shiga Toxin 2 07/26/2023 CANCELED   Final-Edited    Result canceled by the ancillary.    Norovirus GI/GII 07/26/2023 CANCELED   Final-Edited    Result canceled by the ancillary.    Rotavirus A 07/26/2023 CANCELED   Final-Edited    Comment:  The enteric PCR panel is a panel of sensitive and specific   amplified nucleic acid tests indicated as an aid in the   diagnosis of specific bacterial and viral agents of   gastrointestinal illness, in conjunction with other   clinical, laboratory, and epidemiological information.   This test is not approved for monitoring these infections.   Monitoring is available for Salmonella and Shigella   infections-request test \"Stool PCR Follow-Up (STLPF)\".   Monitoring tests are not available at this time for other   enteric agents in this panel.    Result canceled by the ancillary.     Orders Only on 07/19/2023   Component Date Value Ref Range Status    Glucose 07/19/2023 100 (H)  74 - 99 mg/dL Final    Sodium 07/19/2023 140  136 - 145 mmol/L Final    Potassium 07/19/2023 3.5  3.5 - 5.3 mmol/L Final    Chloride 07/19/2023 104  98 - 107 mmol/L Final    Bicarbonate 07/19/2023 29  21 - 32 mmol/L Final    Anion Gap 07/19/2023 11  10 - 20 mmol/L Final    Urea Nitrogen 07/19/2023 13  6 - 23 mg/dL Final    Creatinine 07/19/2023 0.86  0.50 - 1.30 mg/dL Final    GFR MALE 07/19/2023 >90  >90 mL/min/1.73m2 " Final    Comment:  CALCULATIONS OF ESTIMATED GFR ARE PERFORMED   USING THE 2021 CKD-EPI STUDY REFIT EQUATION   WITHOUT THE RACE VARIABLE FOR THE IDMS-TRACEABLE   CREATININE METHODS.    https://jasn.asnjournals.org/content/early/2021/09/22/ASN.8924250779      Calcium 07/19/2023 8.7  8.6 - 10.3 mg/dL Final    Albumin 07/19/2023 3.9  3.4 - 5.0 g/dL Final    Alkaline Phosphatase 07/19/2023 52  33 - 136 U/L Final    Total Protein 07/19/2023 6.7  6.4 - 8.2 g/dL Final    AST 07/19/2023 22  9 - 39 U/L Final    Total Bilirubin 07/19/2023 0.8  0.0 - 1.2 mg/dL Final    ALT (SGPT) 07/19/2023 16  10 - 52 U/L Final    Comment:  Patients treated with Sulfasalazine may generate    falsely decreased results for ALT.      WBC 07/19/2023 3.6 (L)  4.4 - 11.3 x10E9/L Final    RBC 07/19/2023 4.39 (L)  4.50 - 5.90 x10E12/L Final    Hemoglobin 07/19/2023 14.4  13.5 - 17.5 g/dL Final    Hematocrit 07/19/2023 42.8  41.0 - 52.0 % Final    MCV 07/19/2023 97  80 - 100 fL Final    MCHC 07/19/2023 33.6  32.0 - 36.0 g/dL Final    Platelets 07/19/2023 127 (L)  150 - 450 x10E9/L Final    RDW 07/19/2023 13.1  11.5 - 14.5 % Final    Neutrophils % 07/19/2023 60.8  40.0 - 80.0 % Final    Immature Granulocytes %, Automated 07/19/2023 0.3  0.0 - 0.9 % Final    Comment:  Immature Granulocyte Count (IG) includes promyelocytes,    myelocytes and metamyelocytes but does not include bands.   Percent differential counts (%) should be interpreted in the   context of the absolute cell counts (cells/L).      Lymphocytes % 07/19/2023 20.8  13.0 - 44.0 % Final    Monocytes % 07/19/2023 13.5  2.0 - 10.0 % Final    Eosinophils % 07/19/2023 2.3  0.0 - 6.0 % Final    Basophils % 07/19/2023 2.3  0.0 - 2.0 % Final    Neutrophils Absolute 07/19/2023 2.16  1.20 - 7.70 x10E9/L Final    Lymphocytes Absolute 07/19/2023 0.74 (L)  1.20 - 4.80 x10E9/L Final    Monocytes Absolute 07/19/2023 0.48  0.10 - 1.00 x10E9/L Final    Eosinophils Absolute 07/19/2023 0.08  0.00 - 0.70  x10E9/L Final    Basophils Absolute 07/19/2023 0.08  0.00 - 0.10 x10E9/L Final    Total IgG 07/19/2023 1,860 (H)  700 - 1,600 mg/dL Final    Comment: MONOCLONAL PROTEINS MAY CAUSE FALSELY LOW  RESULTS IN THIS ASSAY. SERUM PROTEIN  ELECTROPHORESIS SHOULD BE DONE AS THE  FIRST TEST TO EVALUATE MONOCLONAL GAMMOPATHY.      IgA 07/19/2023 67 (L)  70 - 400 mg/dL Final    Comment: MONOCLONAL PROTEINS MAY CAUSE FALSELY LOW  RESULTS IN THIS ASSAY. SERUM PROTEIN  ELECTROPHORESIS SHOULD BE DONE AS THE  FIRST TEST TO EVALUATE MONOCLONAL GAMMOPATHY.      IgM 07/19/2023 40  40 - 230 mg/dL Final    Comment: MONOCLONAL PROTEINS MAY CAUSE FALSELY LOW  RESULTS IN THIS ASSAY. SERUM PROTEIN  ELECTROPHORESIS SHOULD BE DONE AS THE  FIRST TEST TO EVALUATE MONOCLONAL GAMMOPATHY.      Ig [place] Free Light Chain 07/19/2023 1.84  0.33 - 1.94 mg/dL Final    Ig Lambda Free Light Chain 07/19/2023 1.11  0.57 - 2.63 mg/dL Final    Kappa/Lambda LC Ratio 07/19/2023 1.66 (H)  0.26 - 1.65 Final    Comment:  Undetected antigen excess is a rare event but cannot be   excluded. If these free light chain results do not agree   with other clinical or laboratory findings, or if the   sample is from a patient that has previously demonstrated   antigen excess, the result must be checked by retesting   at a higher sample dilution. Results should always be   interpreted in conjunction with other laboratory tests   and clinical evidence; any anomalies should be discussed   with the testing laboratory.      Total Protein 07/19/2023 6.7  6.4 - 8.2 g/dL Final    Albumin 07/19/2023 3.9  3.4 - 5.0 g/dL Final    Alpha 1 07/19/2023 0.2  0.2 - 0.6 g/dL Final    Alpha 2 07/19/2023 0.5  0.4 - 1.1 g/dL Final    Beta 07/19/2023 0.5  0.5 - 1.2 g/dL Final    Gamma 07/19/2023 1.6 (H)  0.5 - 1.4 g/dL Final    M-PROTEIN 1 07/19/2023 1.0 (A)  Not Detected g/dL Final    SPE Interpretation 07/19/2023 ABNORMAL   Final    Comment: Aberrant band detected in the gamma region, consistent  with the patient's   known monoclonal IgG lambda at 1.0 g/dL. Last detected on 03/15/23 at 1.1   g/dL.      Path Review - Serum Protein Electr* 07/19/2023 DARRION   Final    Comment:  By her/his signature above, the Pathologist   listed as making the final interpretation   certifies that she/he has personally reviewed    this case.  ----------------------------------------------      Orders Only on 07/12/2023   Component Date Value Ref Range Status    WBC 07/12/2023 3.1 (L)  4.4 - 11.3 x10E9/L Final    RBC 07/12/2023 4.51  4.50 - 5.90 x10E12/L Final    Hemoglobin 07/12/2023 15.0  13.5 - 17.5 g/dL Final    Hematocrit 07/12/2023 44.1  41.0 - 52.0 % Final    MCV 07/12/2023 98  80 - 100 fL Final    MCHC 07/12/2023 34.0  32.0 - 36.0 g/dL Final    Platelets 07/12/2023 160  150 - 450 x10E9/L Final    RDW 07/12/2023 13.4  11.5 - 14.5 % Final    Glucose 07/12/2023 91  74 - 99 mg/dL Final    Sodium 07/12/2023 140  136 - 145 mmol/L Final    Potassium 07/12/2023 3.4 (L)  3.5 - 5.3 mmol/L Final    Chloride 07/12/2023 105  98 - 107 mmol/L Final    Bicarbonate 07/12/2023 27  21 - 32 mmol/L Final    Anion Gap 07/12/2023 11  10 - 20 mmol/L Final    Urea Nitrogen 07/12/2023 14  6 - 23 mg/dL Final    Creatinine 07/12/2023 0.94  0.50 - 1.30 mg/dL Final    GFR MALE 07/12/2023 89  >90 mL/min/1.73m2 Final    Comment:  CALCULATIONS OF ESTIMATED GFR ARE PERFORMED   USING THE 2021 CKD-EPI STUDY REFIT EQUATION   WITHOUT THE RACE VARIABLE FOR THE IDMS-TRACEABLE   CREATININE METHODS.    https://jasn.asnjournals.org/content/early/2021/09/22/ASN.7480552554      Calcium 07/12/2023 8.6  8.6 - 10.3 mg/dL Final    Albumin 07/12/2023 3.8  3.4 - 5.0 g/dL Final    Alkaline Phosphatase 07/12/2023 63  33 - 136 U/L Final    Total Protein 07/12/2023 6.6  6.4 - 8.2 g/dL Final    AST 07/12/2023 20  9 - 39 U/L Final    Total Bilirubin 07/12/2023 1.4 (H)  0.0 - 1.2 mg/dL Final    ALT (SGPT) 07/12/2023 16  10 - 52 U/L Final    Comment:  Patients treated with  Sulfasalazine may generate    falsely decreased results for ALT.      Cholesterol 07/12/2023 174  0 - 199 mg/dL Final    Comment: .      AGE      DESIRABLE   BORDERLINE HIGH   HIGH     0-19 Y     0 - 169       170 - 199     >/= 200    20-24 Y     0 - 189       190 - 224     >/= 225         >24 Y     0 - 199       200 - 239     >/= 240   **All ranges are based on fasting samples. Specific   therapeutic targets will vary based on patient-specific   cardiac risk.  .   Pediatric guidelines reference:Pediatrics 2011, 128(S5).   Adult guidelines reference: NCEP ATPIII Guidelines,     MALORIE 2001, 258:2486-97  .   Venipuncture immediately after or during the    administration of Metamizole may lead to falsely   low results. Testing should be performed immediately   prior to Metamizole dosing.      HDL 07/12/2023 71.3  mg/dL Final    Comment: .      AGE      VERY LOW   LOW     NORMAL    HIGH       0-19 Y       < 35   < 40     40-45     ----    20-24 Y       ----   < 40       >45     ----      >24 Y       ----   < 40     40-60      >60  .      Cholesterol/HDL Ratio 07/12/2023 2.4   Final    Comment: REF VALUES  DESIRABLE  < 3.4  HIGH RISK  > 5.0      LDL 07/12/2023 82  0 - 99 mg/dL Final    Comment: .                           NEAR      BORD      AGE      DESIRABLE  OPTIMAL    HIGH     HIGH     VERY HIGH     0-19 Y     0 - 109     ---    110-129   >/= 130     ----    20-24 Y     0 - 119     ---    120-159   >/= 160     ----      >24 Y     0 -  99   100-129  130-159   160-189     >/=190  .      VLDL 07/12/2023 21  0 - 40 mg/dL Final    Triglycerides 07/12/2023 104  0 - 149 mg/dL Final    Comment: .      AGE      DESIRABLE   BORDERLINE HIGH   HIGH     VERY HIGH   0 D-90 D    19 - 174         ----         ----        ----  91 D- 9 Y     0 -  74        75 -  99     >/= 100      ----    10-19 Y     0 -  89        90 - 129     >/= 130      ----    20-24 Y     0 - 114       115 - 149     >/= 150      ----         >24 Y     0 - 149        150 - 199    200- 499    >/= 500  .   Venipuncture immediately after or during the    administration of Metamizole may lead to falsely   low results. Testing should be performed immediately   prior to Metamizole dosing.      TSH 07/12/2023 2.16  0.44 - 3.98 mIU/L Final    Comment:  TSH testing is performed using different testing    methodology at [facility] than at other    [facility]. Direct result comparisons should    only be made within the same method.      PSA 07/12/2023 0.40  0.00 - 4.00 ng/mL Final    Comment: The FDA requires that the method used for PSA assay be   reported to the physician. Values obtained with different   assay methods must not be used interchangeably. This test   was performed at [facility] using the [employer] PSA method, which is a sandwich immunoassay using   chemiluminescence for quantitation. The assay is approved  for measurement of prostate-specific antigen (PSA) in   serum and may be used in conjunction with a digital rectal  examination in men 50 years and older as an aid in   detection of prostate cancer.   5-Alpha-reductase inhibitors (e.g. Proscar, Finasteride,   Avodart, Dutasteride and Regla) for the treatment of BPH   have been shown to lower PSA levels by an average of 50%   after 6 months of treatment.      Hemoglobin A1C 07/12/2023 5.2  % Final    Comment:      Diagnosis of Diabetes-Adults   Non-Diabetic: < or = 5.6%   Increased risk for developing diabetes: 5.7-6.4%   Diagnostic of diabetes: > or = 6.5%  .       Monitoring of Diabetes                Age (y)     Therapeutic Goal (%)   Adults:          >18           <7.0   Pediatrics:    13-18           <7.5                   7-12           <8.0                   0- 6            7.5-8.5   American Diabetes Association. Diabetes Care 33(S1), Jan 2010.      Estimated Average Glucose 07/12/2023 103  MG/DL Final         Assessment:    Smodering multiple myeloma, felt to be a  high risk of progression into multiple myeloma.   started on Revlimid/decadron in 2014- see HPI for the detail Hx.    Patient with a diagnosis of smoldering multiple myeloma currently being treated with Revlimid.  On 5 mg p.o. day 1 to day 21 of a 28-day cycle.  Review of immuno labs indicate stability of disease with no evidence of progression.  Free light chain assay today is within the normal limits including the kappa lambda ratio.      Plan:    1. Multiple myeloma, IgG lambda- smoldering(Plasma cell 5-10% in bone marrow, negative skeletal survey)  Currently on Revlimid 5 mg 21 d out of 28 d cycle.   Tolerating well except occasional diarrhea and with mild leukopenia and thrombocytopenia.   reviewed and discussed lab results with pt in detail. IgG ~ 1900, M protein 1.2 g/dL  Continue Revlimid 5 mg daily 3 weeks on one week off.   - follow Ig's, SPEP. CBC and CMP.  follow up in 4 months with new MD    Patient verbalized understanding, and all his questions were answered to his satisfaction     30 min spent with patient greater than 50 % of which was spent in consultation and coordination of care.

## 2023-12-07 ENCOUNTER — TELEPHONE (OUTPATIENT)
Dept: HEMATOLOGY/ONCOLOGY | Facility: CLINIC | Age: 68
End: 2023-12-07
Payer: MEDICARE

## 2023-12-07 DIAGNOSIS — C90.00 MULTIPLE MYELOMA NOT HAVING ACHIEVED REMISSION (MULTI): Primary | ICD-10-CM

## 2023-12-08 RX ORDER — LENALIDOMIDE 10 MG/1
10 CAPSULE ORAL DAILY
Qty: 30 CAPSULE | Refills: 0 | Status: SHIPPED | OUTPATIENT
Start: 2023-12-08 | End: 2024-01-05 | Stop reason: DRUGHIGH

## 2023-12-09 DIAGNOSIS — C90.00 MULTIPLE MYELOMA NOT HAVING ACHIEVED REMISSION (MULTI): Primary | ICD-10-CM

## 2023-12-15 ENCOUNTER — TELEPHONE (OUTPATIENT)
Dept: HEMATOLOGY/ONCOLOGY | Facility: CLINIC | Age: 68
End: 2023-12-15
Payer: MEDICARE

## 2023-12-15 RX ORDER — LENALIDOMIDE 5 MG/1
CAPSULE ORAL
Qty: 21 CAPSULE | Refills: 0 | Status: SHIPPED | OUTPATIENT
Start: 2023-12-15 | End: 2024-01-05 | Stop reason: SDUPTHER

## 2023-12-15 NOTE — TELEPHONE ENCOUNTER
Called and spoke to pt and told we are working on his prescription and correcting the script.  Pt was glad to hear this.  Told we would call back when done.  Pt verbalized agreement with plan.

## 2023-12-15 NOTE — TELEPHONE ENCOUNTER
Called [facility] and confirmed that Dr. Gomez has submitted the new prescription.  Also requested that prescription be done urgently and over nighted to pt.  Confirmed that they were able to use the [employer] authorization number.  Called [facility] and they concur that they are able to use the authorization number.  Called and LM that the prescription is in the process of being filled and to call in a bit to arrange delivery if he has not heard from them.

## 2023-12-18 NOTE — TELEPHONE ENCOUNTER
Dr. Gomez put in new medication order.  Called pharmacy to verify, requested medication be sent urgently, overnight delivery and then [facility] to verify.  Pharmacy said they were in process of filling prescription.  Called and LM on pt's VM informing him of this and to call for delivery.   This note was placed in other area.  This is the response to medication request.

## 2023-12-20 DIAGNOSIS — I10 HYPERTENSION, UNSPECIFIED TYPE: Primary | ICD-10-CM

## 2023-12-20 RX ORDER — HYDROCHLOROTHIAZIDE 25 MG/1
25 TABLET ORAL DAILY
Qty: 90 TABLET | Refills: 3 | Status: SHIPPED | OUTPATIENT
Start: 2023-12-20

## 2023-12-28 ENCOUNTER — LAB (OUTPATIENT)
Dept: LAB | Facility: LAB | Age: 68
End: 2023-12-28
Payer: MEDICARE

## 2023-12-28 DIAGNOSIS — N40.1 BENIGN PROSTATIC HYPERPLASIA WITH LOWER URINARY TRACT SYMPTOMS: Primary | ICD-10-CM

## 2023-12-28 LAB — PSA SERPL-MCNC: 0.47 NG/ML

## 2023-12-28 PROCEDURE — 36415 COLL VENOUS BLD VENIPUNCTURE: CPT

## 2023-12-28 PROCEDURE — 84153 ASSAY OF PSA TOTAL: CPT

## 2024-01-04 ENCOUNTER — TELEPHONE (OUTPATIENT)
Dept: HEMATOLOGY/ONCOLOGY | Facility: CLINIC | Age: 69
End: 2024-01-04
Payer: MEDICARE

## 2024-01-05 DIAGNOSIS — C90.00 MULTIPLE MYELOMA NOT HAVING ACHIEVED REMISSION (MULTI): ICD-10-CM

## 2024-01-05 RX ORDER — LENALIDOMIDE 5 MG/1
5 CAPSULE ORAL DAILY
Qty: 21 CAPSULE | Refills: 0 | Status: SHIPPED | OUTPATIENT
Start: 2024-01-05 | End: 2024-02-02 | Stop reason: SDUPTHER

## 2024-01-12 DIAGNOSIS — C90.00 MULTIPLE MYELOMA NOT HAVING ACHIEVED REMISSION (MULTI): ICD-10-CM

## 2024-01-12 RX ORDER — OMEPRAZOLE 20 MG/1
CAPSULE, DELAYED RELEASE ORAL
Qty: 90 CAPSULE | Refills: 2 | Status: SHIPPED | OUTPATIENT
Start: 2024-01-12

## 2024-01-17 ENCOUNTER — TELEPHONE (OUTPATIENT)
Dept: HEMATOLOGY/ONCOLOGY | Facility: CLINIC | Age: 69
End: 2024-01-17
Payer: MEDICARE

## 2024-01-17 NOTE — TELEPHONE ENCOUNTER
DEMETRIUS TOWNSEND is going to follow up with pt request.  Pt called and told she was going to follow up and maybe calling.  Pt was glad to hear that.

## 2024-01-18 ENCOUNTER — SOCIAL WORK (OUTPATIENT)
Dept: CASE MANAGEMENT | Facility: HOSPITAL | Age: 69
End: 2024-01-18
Payer: MEDICARE

## 2024-01-18 NOTE — PROGRESS NOTES
CARY reached out to patient on the phone. Introduced self. CARY advised patent that the diagnosis verification form for the 2024 S simón was uploaded to [email or web address] today. Patient appreciative. SW to follow.

## 2024-02-01 ENCOUNTER — TELEPHONE (OUTPATIENT)
Dept: HEMATOLOGY/ONCOLOGY | Facility: CLINIC | Age: 69
End: 2024-02-01
Payer: MEDICARE

## 2024-02-02 DIAGNOSIS — C90.00 MULTIPLE MYELOMA NOT HAVING ACHIEVED REMISSION (MULTI): ICD-10-CM

## 2024-02-02 RX ORDER — LENALIDOMIDE 5 MG/1
5 CAPSULE ORAL DAILY
Qty: 21 CAPSULE | Refills: 0 | Status: SHIPPED | OUTPATIENT
Start: 2024-02-02 | End: 2024-02-29 | Stop reason: SDUPTHER

## 2024-02-22 DIAGNOSIS — E03.9 HYPOTHYROIDISM, UNSPECIFIED TYPE: Primary | ICD-10-CM

## 2024-02-22 RX ORDER — LEVOTHYROXINE SODIUM 25 UG/1
25 TABLET ORAL DAILY
Qty: 90 TABLET | Refills: 0 | Status: SHIPPED | OUTPATIENT
Start: 2024-02-22 | End: 2024-05-13

## 2024-02-29 ENCOUNTER — TELEPHONE (OUTPATIENT)
Dept: HEMATOLOGY/ONCOLOGY | Facility: CLINIC | Age: 69
End: 2024-02-29
Payer: MEDICARE

## 2024-02-29 DIAGNOSIS — C90.00 MULTIPLE MYELOMA NOT HAVING ACHIEVED REMISSION (MULTI): ICD-10-CM

## 2024-02-29 RX ORDER — LENALIDOMIDE 5 MG/1
5 CAPSULE ORAL DAILY
Qty: 21 CAPSULE | Refills: 0 | Status: SHIPPED | OUTPATIENT
Start: 2024-02-29 | End: 2024-03-28

## 2024-03-06 DIAGNOSIS — I10 HYPERTENSION, UNSPECIFIED TYPE: Primary | ICD-10-CM

## 2024-03-07 RX ORDER — LOSARTAN POTASSIUM 100 MG/1
100 TABLET ORAL DAILY
Qty: 90 TABLET | Refills: 0 | Status: SHIPPED | OUTPATIENT
Start: 2024-03-07 | End: 2024-05-28

## 2024-03-22 ENCOUNTER — TELEPHONE (OUTPATIENT)
Dept: HEMATOLOGY/ONCOLOGY | Facility: CLINIC | Age: 69
End: 2024-03-22
Payer: MEDICARE

## 2024-03-22 DIAGNOSIS — C90.00 MULTIPLE MYELOMA NOT HAVING ACHIEVED REMISSION (MULTI): Primary | ICD-10-CM

## 2024-03-22 NOTE — TELEPHONE ENCOUNTER
Pt returned call.  He states he never had that happen before when he went to get labs as they are usually already ordered.  Told he can call prior to going, or he can ask them to call if it ever happens again.  Told I already have a request for provider to put lab order in.  He agreed to go next week to have labs drawn.

## 2024-03-25 ENCOUNTER — LAB (OUTPATIENT)
Dept: LAB | Facility: LAB | Age: 69
End: 2024-03-25
Payer: MEDICARE

## 2024-03-25 DIAGNOSIS — C90.00 MULTIPLE MYELOMA NOT HAVING ACHIEVED REMISSION (MULTI): ICD-10-CM

## 2024-03-25 LAB
ALBUMIN SERPL BCP-MCNC: 4 G/DL (ref 3.4–5)
ALP SERPL-CCNC: 62 U/L (ref 33–136)
ALT SERPL W P-5'-P-CCNC: 25 U/L (ref 10–52)
ANION GAP SERPL CALC-SCNC: 16 MMOL/L (ref 10–20)
AST SERPL W P-5'-P-CCNC: 45 U/L (ref 9–39)
BASOPHILS # BLD AUTO: 0.08 X10*3/UL (ref 0–0.1)
BASOPHILS NFR BLD AUTO: 2.1 %
BILIRUB SERPL-MCNC: 1.4 MG/DL (ref 0–1.2)
BUN SERPL-MCNC: 17 MG/DL (ref 6–23)
CALCIUM SERPL-MCNC: 8.7 MG/DL (ref 8.6–10.3)
CHLORIDE SERPL-SCNC: 102 MMOL/L (ref 98–107)
CO2 SERPL-SCNC: 28 MMOL/L (ref 21–32)
CREAT SERPL-MCNC: 0.97 MG/DL (ref 0.5–1.3)
EGFRCR SERPLBLD CKD-EPI 2021: 86 ML/MIN/1.73M*2
EOSINOPHIL # BLD AUTO: 0.13 X10*3/UL (ref 0–0.7)
EOSINOPHIL NFR BLD AUTO: 3.5 %
ERYTHROCYTE [DISTWIDTH] IN BLOOD BY AUTOMATED COUNT: 13.8 % (ref 11.5–14.5)
GLUCOSE SERPL-MCNC: 94 MG/DL (ref 74–99)
HCT VFR BLD AUTO: 45.3 % (ref 41–52)
HGB BLD-MCNC: 15.1 G/DL (ref 13.5–17.5)
IMM GRANULOCYTES # BLD AUTO: 0 X10*3/UL (ref 0–0.7)
IMM GRANULOCYTES NFR BLD AUTO: 0 % (ref 0–0.9)
LYMPHOCYTES # BLD AUTO: 1.23 X10*3/UL (ref 1.2–4.8)
LYMPHOCYTES NFR BLD AUTO: 32.9 %
MCH RBC QN AUTO: 33.6 PG (ref 26–34)
MCHC RBC AUTO-ENTMCNC: 33.3 G/DL (ref 32–36)
MCV RBC AUTO: 101 FL (ref 80–100)
MONOCYTES # BLD AUTO: 0.71 X10*3/UL (ref 0.1–1)
MONOCYTES NFR BLD AUTO: 19 %
NEUTROPHILS # BLD AUTO: 1.59 X10*3/UL (ref 1.2–7.7)
NEUTROPHILS NFR BLD AUTO: 42.5 %
NRBC BLD-RTO: 0 /100 WBCS (ref 0–0)
PLATELET # BLD AUTO: 152 X10*3/UL (ref 150–450)
POTASSIUM SERPL-SCNC: 3.9 MMOL/L (ref 3.5–5.3)
PROT SERPL-MCNC: 7.4 G/DL (ref 6.4–8.2)
RBC # BLD AUTO: 4.49 X10*6/UL (ref 4.5–5.9)
SODIUM SERPL-SCNC: 142 MMOL/L (ref 136–145)
WBC # BLD AUTO: 3.7 X10*3/UL (ref 4.4–11.3)

## 2024-03-25 PROCEDURE — 85025 COMPLETE CBC W/AUTO DIFF WBC: CPT

## 2024-03-25 PROCEDURE — 84165 PROTEIN E-PHORESIS SERUM: CPT

## 2024-03-25 PROCEDURE — 80053 COMPREHEN METABOLIC PANEL: CPT

## 2024-03-25 PROCEDURE — 84165 PROTEIN E-PHORESIS SERUM: CPT | Performed by: PHYSICIAN ASSISTANT

## 2024-03-25 PROCEDURE — 82784 ASSAY IGA/IGD/IGG/IGM EACH: CPT

## 2024-03-25 PROCEDURE — 86320 SERUM IMMUNOELECTROPHORESIS: CPT | Performed by: PHYSICIAN ASSISTANT

## 2024-03-25 PROCEDURE — 36415 COLL VENOUS BLD VENIPUNCTURE: CPT

## 2024-03-25 PROCEDURE — 83521 IG LIGHT CHAINS FREE EACH: CPT

## 2024-03-25 PROCEDURE — 86334 IMMUNOFIX E-PHORESIS SERUM: CPT

## 2024-03-25 PROCEDURE — 84155 ASSAY OF PROTEIN SERUM: CPT

## 2024-03-26 LAB
IGA SERPL-MCNC: 61 MG/DL (ref 70–400)
IGG SERPL-MCNC: 2110 MG/DL (ref 700–1600)
IGM SERPL-MCNC: 49 MG/DL (ref 40–230)
KAPPA LC SERPL-MCNC: 1.85 MG/DL (ref 0.33–1.94)
KAPPA LC/LAMBDA SER: 1.4 {RATIO} (ref 0.26–1.65)
LAMBDA LC SERPL-MCNC: 1.32 MG/DL (ref 0.57–2.63)
PROT SERPL-MCNC: 7.3 G/DL (ref 6.4–8.2)

## 2024-03-27 ENCOUNTER — APPOINTMENT (OUTPATIENT)
Dept: HEMATOLOGY/ONCOLOGY | Facility: CLINIC | Age: 69
End: 2024-03-27
Payer: MEDICARE

## 2024-03-27 DIAGNOSIS — C90.00 MULTIPLE MYELOMA NOT HAVING ACHIEVED REMISSION (MULTI): ICD-10-CM

## 2024-03-28 RX ORDER — LENALIDOMIDE 5 MG/1
5 CAPSULE ORAL DAILY
Qty: 21 CAPSULE | Refills: 0 | Status: SHIPPED | OUTPATIENT
Start: 2024-03-28 | End: 2024-04-30 | Stop reason: SDUPTHER

## 2024-03-30 LAB
ALBUMIN: 4.2 G/DL (ref 3.4–5)
ALPHA 1 GLOBULIN: 0.2 G/DL (ref 0.2–0.6)
ALPHA 2 GLOBULIN: 0.5 G/DL (ref 0.4–1.1)
BETA GLOBULIN: 0.7 G/DL (ref 0.5–1.2)
GAMMA GLOBULIN: 1.7 G/DL (ref 0.5–1.4)
IMMUNOFIXATION COMMENT: ABNORMAL
M-PROTEIN 1: 1.2 G/DL
PATH REVIEW - SERUM IMMUNOFIXATION: ABNORMAL
PATH REVIEW-SERUM PROTEIN ELECTROPHORESIS: ABNORMAL
PROTEIN ELECTROPHORESIS COMMENT: ABNORMAL

## 2024-04-03 ENCOUNTER — OFFICE VISIT (OUTPATIENT)
Dept: HEMATOLOGY/ONCOLOGY | Facility: CLINIC | Age: 69
End: 2024-04-03
Payer: MEDICARE

## 2024-04-03 VITALS
HEIGHT: 71 IN | SYSTOLIC BLOOD PRESSURE: 139 MMHG | OXYGEN SATURATION: 94 % | BODY MASS INDEX: 31.53 KG/M2 | TEMPERATURE: 97.9 F | WEIGHT: 225.2 LBS | DIASTOLIC BLOOD PRESSURE: 77 MMHG | HEART RATE: 66 BPM | RESPIRATION RATE: 18 BRPM

## 2024-04-03 DIAGNOSIS — C90.01 MULTIPLE MYELOMA IN REMISSION (MULTI): ICD-10-CM

## 2024-04-03 PROCEDURE — 1126F AMNT PAIN NOTED NONE PRSNT: CPT | Performed by: PHYSICIAN ASSISTANT

## 2024-04-03 PROCEDURE — 99214 OFFICE O/P EST MOD 30 MIN: CPT | Performed by: PHYSICIAN ASSISTANT

## 2024-04-03 PROCEDURE — 3078F DIAST BP <80 MM HG: CPT | Performed by: PHYSICIAN ASSISTANT

## 2024-04-03 PROCEDURE — 1159F MED LIST DOCD IN RCRD: CPT | Performed by: PHYSICIAN ASSISTANT

## 2024-04-03 PROCEDURE — 3075F SYST BP GE 130 - 139MM HG: CPT | Performed by: PHYSICIAN ASSISTANT

## 2024-04-03 RX ORDER — GABAPENTIN 300 MG/1
300 CAPSULE ORAL
COMMUNITY

## 2024-04-03 ASSESSMENT — PATIENT HEALTH QUESTIONNAIRE - PHQ9
1. LITTLE INTEREST OR PLEASURE IN DOING THINGS: NOT AT ALL
SUM OF ALL RESPONSES TO PHQ9 QUESTIONS 1 AND 2: 0
2. FEELING DOWN, DEPRESSED OR HOPELESS: NOT AT ALL

## 2024-04-03 ASSESSMENT — COLUMBIA-SUICIDE SEVERITY RATING SCALE - C-SSRS
2. HAVE YOU ACTUALLY HAD ANY THOUGHTS OF KILLING YOURSELF?: NO
1. IN THE PAST MONTH, HAVE YOU WISHED YOU WERE DEAD OR WISHED YOU COULD GO TO SLEEP AND NOT WAKE UP?: NO
6. HAVE YOU EVER DONE ANYTHING, STARTED TO DO ANYTHING, OR PREPARED TO DO ANYTHING TO END YOUR LIFE?: NO

## 2024-04-03 ASSESSMENT — PAIN SCALES - GENERAL: PAINLEVEL: 0-NO PAIN

## 2024-04-03 NOTE — PROGRESS NOTES
Patient Visit Information:   Visit Type: New Visit, Follow Up Visit     Cancer History:    Multiple Myeloma/Plasmacytoma   AJCC Edition: 7th, Diagnosis Date: 01-Oct-2012, Stage 1A,        Treatment Synopsis:    developed PNA in 6/2018 s/p augmentin for total 7 day course. off revlimid for a few weeks and was restarted in late June 2018 with lower dose 10 mg 3 weeks on one week off.     had URI in 4/2019 with cough resolved with 2 courses of abx.     c/o intermittent diarrhea improved from before, imodium helped. c/o stable to improved tolerated numbness of feet. appetite is good. denies SOB and CP, fever or chills.    has been taking KCL 30 meq daily.    Patient with Smodering multiple myeloma, IgG lambda, diagnosed around 2011, felt to be a high risk of progression into multiple myeloma. started on revlimid and decadron, off decadron since 12/9/14. M spike began to increase. responded well with resuming decadron since 3/3/15. dose of revlimid was increased to 15 mg given increased M protein since March 2015. tapered decadron to 8 mg weekly, then off in 9/2017 due to concerns for infection. PNA in 6/2018 dose of revlimid was down to 10 mg daily 3 weeks on one week off. M protein has slowly increasing to 1.33 g/dL on 2/19/19 and 1.36 g/dL on 5/14/19, 1.7 g/dL on 11/6/19 and 1.3 g/dL on 1/8/2020. m protein down to 1.3 from 1.6 in 4/2020 and stable since then.    11/28/22: first virtual visit with me today. His Revlimid dosage was dropped from 10 mg, 3 weeks on one week off to 5 mg every day, 3 weeks on one week off. His markers has been stable. On assessment, he is doing well. Energy and appetite is stable. Peripheral neuropathy stable/improved. He denies fever, chills, night sweats, anorexia, weight loss, CP, SOB, abd pain, n/v/d, bleeding, and any other complaints at this time.     Interval History:  Patient presents for follow up. Overall doing well. On Revlimid 5 mg daily, tolerating well aside from intermittent  diarrhea. Patient doing PT for left leg sciatica. No worsening neuropathy in hands or feet. Otherwise no new complaints.     Review of Symptoms:  All of the systems have been reviewed and are negative for complaints except what is stated in the HPI and/or past medical history.    Allergies and Intolerances:       Allergies:   No Known Allergies: Active  Current Outpatient Medications on File Prior to Visit   Medication Sig Dispense Refill    amLODIPine (Norvasc) 10 mg tablet Take 1 tablet (10 mg) by mouth once daily.      aspirin 81 mg EC tablet Take 1 tablet (81 mg) by mouth once daily.      diphenhydrAMINE (Sominex) 25 mg tablet Take 1 tablet (25 mg) by mouth once daily as needed for sleep.      finasteride (Proscar) 5 mg tablet Take 1 tablet (5 mg) by mouth once daily.      gabapentin (Neurontin) 300 mg capsule Take 1 capsule (300 mg) by mouth once daily (M-F).      hydroCHLOROthiazide (HYDRODiuril) 25 mg tablet TAKE 1 TABLET BY MOUTH EVERY DAY 90 tablet 3    ibuprofen 800 mg tablet Take 1 tablet (800 mg) by mouth every 6 hours if needed.      lenalidomide (Revlimid) 5 mg capsule Take 1 capsule (5 mg total) by mouth once daily for 21 days.  Take 5mg PO D1-D21 then 7D off: AUTH# [id number] 21 capsule 0    levothyroxine (Synthroid, Levoxyl) 25 mcg tablet TAKE 1 TABLET BY MOUTH EVERY DAY 90 tablet 0    losartan (Cozaar) 100 mg tablet TAKE 1 TABLET BY MOUTH EVERY DAY 90 tablet 0    meloxicam (Mobic) 7.5 mg tablet Take 1 tablet (7.5 mg) by mouth.      multivit-min/ferrous fumarate (MULTI VITAMIN ORAL) Take by mouth.      omeprazole (PriLOSEC) 20 mg DR capsule Take 1 capsule (20 mg) by mouth once daily in the morning. Take before meals.      omeprazole (PriLOSEC) 20 mg DR capsule TAKE 1 CAPSULE BY MOUTH ONCE DAILY IN THE MORNING. TAKE BEFORE MEALS. DO NOT CRUSH, CHEW, OR SPLIT. 90 capsule 2    potassium chloride CR (Klor-Con M20) 20 mEq ER tablet TAKE 1 AND 1/2 TABLETS BY MOUTH EVERY DAY 45 tablet 11    [DISCONTINUED]  "lenalidomide (Revlimid) 5 mg capsule Take 1 capsule (5 mg total) by mouth once daily for 21 days.  Take 1 capsule (5 mg total) by mouth once daily for 21 days then hold for 7 Days:Take whole with water. Do not break, chew, or open. Auth # [id number] 21 capsule 0     No current facility-administered medications on file prior to visit.          Medical History:   GERD (gastroesophageal reflux disease): ICD-10: K21.9, Status: Active    Family History: No Family History items are recorded in the problem list.     Social History:   Social Substance History:  · Smoking Status never smoker (1)         3/2/2022     8:13 AM 4/20/2022     3:13 PM 6/15/2022     9:14 AM 7/20/2022     9:36 AM 3/23/2023     8:59 AM 11/27/2023     9:21 AM 4/3/2024     9:09 AM   Vitals   Systolic 121 122 170 153 125 129 139   Diastolic 65 79 91 75 70 78 77   Heart Rate 60 70 71 59 77 59 66   Temp 36.6 °C (97.9 °F) 36.8 °C (98.2 °F)  36.2 °C (97.2 °F) 37 °C (98.6 °F) 36.8 °C (98.2 °F) 36.6 °C (97.9 °F)   Resp 18 17 20 18 16 17 18   Height (in) 1.875 m (6' 1.82\") 1.802 m (5' 10.95\")  1.802 m (5' 10.95\") 1.802 m (5' 10.95\") 1.826 m (5' 11.89\") 1.805 m (5' 11.06\")    Weight (lb) 215.83 213.19  207.01 218.92 219.8 225.2   BMI 27.85 kg/m2 29.78 kg/m2  28.92 kg/m2 30.58 kg/m2 29.9 kg/m2 31.35 kg/m2   BSA (m2) 2.26 m2 2.2 m2  2.17 m2 2.23 m2 2.25 m2 2.26 m2   Visit Report      Report Report       Significant value     Physical Exam:   Constitutional: awake/alert/oriented x3, no distress, alert and cooperative   Eyes: PERRL, EOMI, clear sclera   ENMT: mucous membranes moist, no apparent injury, no lesions seen   Head/Neck: Neck supple, no apparent injury, thyroid without mass or tenderness, No JVD, trachea midline, no bruits   Extremities: no LE edema,     Lab on 03/25/2024   Component Date Value Ref Range Status    WBC 03/25/2024 3.7 (L)  4.4 - 11.3 x10*3/uL Final    nRBC 03/25/2024 0.0  0.0 - 0.0 /100 WBCs Final    RBC 03/25/2024 4.49 (L)  4.50 - 5.90 " x10*6/uL Final    Hemoglobin 03/25/2024 15.1  13.5 - 17.5 g/dL Final    Hematocrit 03/25/2024 45.3  41.0 - 52.0 % Final    MCV 03/25/2024 101 (H)  80 - 100 fL Final    MCH 03/25/2024 33.6  26.0 - 34.0 pg Final    MCHC 03/25/2024 33.3  32.0 - 36.0 g/dL Final    RDW 03/25/2024 13.8  11.5 - 14.5 % Final    Platelets 03/25/2024 152  150 - 450 x10*3/uL Final    Neutrophils % 03/25/2024 42.5  40.0 - 80.0 % Final    Immature Granulocytes %, Automated 03/25/2024 0.0  0.0 - 0.9 % Final    Immature Granulocyte Count (IG) includes promyelocytes, myelocytes and metamyelocytes but does not include bands. Percent differential counts (%) should be interpreted in the context of the absolute cell counts (cells/UL).    Lymphocytes % 03/25/2024 32.9  13.0 - 44.0 % Final    Monocytes % 03/25/2024 19.0  2.0 - 10.0 % Final    Eosinophils % 03/25/2024 3.5  0.0 - 6.0 % Final    Basophils % 03/25/2024 2.1  0.0 - 2.0 % Final    Neutrophils Absolute 03/25/2024 1.59  1.20 - 7.70 x10*3/uL Final    Percent differential counts (%) should be interpreted in the context of the absolute cell counts (cells/uL).    Immature Granulocytes Absolute, Au* 03/25/2024 0.00  0.00 - 0.70 x10*3/uL Final    Lymphocytes Absolute 03/25/2024 1.23  1.20 - 4.80 x10*3/uL Final    Monocytes Absolute 03/25/2024 0.71  0.10 - 1.00 x10*3/uL Final    Eosinophils Absolute 03/25/2024 0.13  0.00 - 0.70 x10*3/uL Final    Basophils Absolute 03/25/2024 0.08  0.00 - 0.10 x10*3/uL Final    Glucose 03/25/2024 94  74 - 99 mg/dL Final    Sodium 03/25/2024 142  136 - 145 mmol/L Final    Potassium 03/25/2024 3.9  3.5 - 5.3 mmol/L Final    Chloride 03/25/2024 102  98 - 107 mmol/L Final    Bicarbonate 03/25/2024 28  21 - 32 mmol/L Final    Anion Gap 03/25/2024 16  10 - 20 mmol/L Final    Urea Nitrogen 03/25/2024 17  6 - 23 mg/dL Final    Creatinine 03/25/2024 0.97  0.50 - 1.30 mg/dL Final    eGFR 03/25/2024 86  >60 mL/min/1.73m*2 Final    Calculations of estimated GFR are performed using  the 2021 CKD-EPI Study Refit equation without the race variable for the IDMS-Traceable creatinine methods.  https://jasn.asnjournals.org/content/early/2021/09/22/ASN.8656700232    Calcium 03/25/2024 8.7  8.6 - 10.3 mg/dL Final    Albumin 03/25/2024 4.0  3.4 - 5.0 g/dL Final    Alkaline Phosphatase 03/25/2024 62  33 - 136 U/L Final    Total Protein 03/25/2024 7.4  6.4 - 8.2 g/dL Final    AST 03/25/2024 45 (H)  9 - 39 U/L Final    Bilirubin, Total 03/25/2024 1.4 (H)  0.0 - 1.2 mg/dL Final    ALT 03/25/2024 25  10 - 52 U/L Final    Patients treated with Sulfasalazine may generate falsely decreased results for ALT.    IgG 03/25/2024 2,110 (H)  700 - 1,600 mg/dL Final    IgA 03/25/2024 61 (L)  70 - 400 mg/dL Final    IgM 03/25/2024 49  40 - 230 mg/dL Final    Ig Kappa Free Light Chain 03/25/2024 1.85  0.33 - 1.94 mg/dL Final    Ig Lambda Free Light Chain 03/25/2024 1.32  0.57 - 2.63 mg/dL Final    Kappa/Lambda Ratio 03/25/2024 1.40  0.26 - 1.65 Final    Total Protein 03/25/2024 7.3  6.4 - 8.2 g/dL Final    Albumin 03/25/2024 4.2  3.4 - 5.0 g/dL Final    Alpha 1 Globulin 03/25/2024 0.2  0.2 - 0.6 g/dL Final    Alpha 2 Globulin 03/25/2024 0.5  0.4 - 1.1 g/dL Final    Beta Globulin 03/25/2024 0.7  0.5 - 1.2 g/dL Final    Gamma 03/25/2024 1.7 (H)  0.5 - 1.4 g/dL Final    M-PROTEIN 1 03/25/2024 1.2 (H)    g/dL Final    Protein Electrophoresis Comment 03/25/2024 Aberrant band detected. See immunofixation.     Final    Immunofixation Comment 03/25/2024 3/25/24 Known monoclonal IgG lambda in the gamma region at 1.2 g/dL. Last detected on 11/21/23 at 1.0 g/dL.   Final    Path Review - Serum Protein Electr* 03/25/2024 Reviewed and approved by EVELIN OCLON on 3/30/24 at 12:18 PM.       Final    Path Review - Serum Immunofixation 03/25/2024 Reviewed and approved by EVELIN COLON on 3/30/24 at 12:18 PM.       Final   Lab on 12/28/2023   Component Date Value Ref Range Status    Prostate Specific AG 12/28/2023 0.47  <=4.00 ng/mL  Final   Lab on 11/21/2023   Component Date Value Ref Range Status    Glucose 11/21/2023 75  74 - 99 mg/dL Final    Sodium 11/21/2023 139  136 - 145 mmol/L Final    Potassium 11/21/2023 3.9  3.5 - 5.3 mmol/L Final    Chloride 11/21/2023 102  98 - 107 mmol/L Final    Bicarbonate 11/21/2023 29  21 - 32 mmol/L Final    Anion Gap 11/21/2023 12  10 - 20 mmol/L Final    Urea Nitrogen 11/21/2023 14  6 - 23 mg/dL Final    Creatinine 11/21/2023 0.92  0.50 - 1.30 mg/dL Final    eGFR 11/21/2023 >90  >60 mL/min/1.73m*2 Final    Calculations of estimated GFR are performed using the 2021 CKD-EPI Study Refit equation without the race variable for the IDMS-Traceable creatinine methods.  https://jasn.asnjournals.org/content/early/2021/09/22/ASN.8682426927    Calcium 11/21/2023 8.8  8.6 - 10.3 mg/dL Final    Albumin 11/21/2023 4.2  3.4 - 5.0 g/dL Final    Alkaline Phosphatase 11/21/2023 64  33 - 136 U/L Final    Total Protein 11/21/2023 6.9  6.4 - 8.2 g/dL Final    AST 11/21/2023 22  9 - 39 U/L Final    Bilirubin, Total 11/21/2023 1.1  0.0 - 1.2 mg/dL Final    ALT 11/21/2023 17  10 - 52 U/L Final    Patients treated with Sulfasalazine may generate falsely decreased results for ALT.    WBC 11/21/2023 4.2 (L)  4.4 - 11.3 x10*3/uL Final    nRBC 11/21/2023 0.0  0.0 - 0.0 /100 WBCs Final    RBC 11/21/2023 4.61  4.50 - 5.90 x10*6/uL Final    Hemoglobin 11/21/2023 15.3  13.5 - 17.5 g/dL Final    Hematocrit 11/21/2023 45.9  41.0 - 52.0 % Final    MCV 11/21/2023 100  80 - 100 fL Final    MCH 11/21/2023 33.2  26.0 - 34.0 pg Final    MCHC 11/21/2023 33.3  32.0 - 36.0 g/dL Final    RDW 11/21/2023 13.4  11.5 - 14.5 % Final    Platelets 11/21/2023 167  150 - 450 x10*3/uL Final    Neutrophils % 11/21/2023 56.1  40.0 - 80.0 % Final    Immature Granulocytes %, Automated 11/21/2023 0.2  0.0 - 0.9 % Final    Immature Granulocyte Count (IG) includes promyelocytes, myelocytes and metamyelocytes but does not include bands. Percent differential counts (%)  should be interpreted in the context of the absolute cell counts (cells/UL).    Lymphocytes % 11/21/2023 23.4  13.0 - 44.0 % Final    Monocytes % 11/21/2023 15.2  2.0 - 10.0 % Final    Eosinophils % 11/21/2023 2.7  0.0 - 6.0 % Final    Basophils % 11/21/2023 2.4  0.0 - 2.0 % Final    Neutrophils Absolute 11/21/2023 2.33  1.20 - 7.70 x10*3/uL Final    Percent differential counts (%) should be interpreted in the context of the absolute cell counts (cells/uL).    Immature Granulocytes Absolute, Au* 11/21/2023 0.01  0.00 - 0.70 x10*3/uL Final    Lymphocytes Absolute 11/21/2023 0.97 (L)  1.20 - 4.80 x10*3/uL Final    Monocytes Absolute 11/21/2023 0.63  0.10 - 1.00 x10*3/uL Final    Eosinophils Absolute 11/21/2023 0.11  0.00 - 0.70 x10*3/uL Final    Basophils Absolute 11/21/2023 0.10  0.00 - 0.10 x10*3/uL Final    Ig [place] Free Light Chain 11/21/2023 1.75  0.33 - 1.94 mg/dL Final    Ig Lambda Free Light Chain 11/21/2023 1.01  0.57 - 2.63 mg/dL Final    Kappa/Lambda Ratio 11/21/2023 1.73 (H)  0.26 - 1.65 Final    Total Protein 11/21/2023 7.2  6.4 - 8.2 g/dL Final    Albumin 11/21/2023 4.2  3.4 - 5.0 g/dL Final    Alpha 1 Globulin 11/21/2023 0.3  0.2 - 0.6 g/dL Final    Alpha 2 Globulin 11/21/2023 0.5  0.4 - 1.1 g/dL Final    Beta Globulin 11/21/2023 0.6  0.5 - 1.2 g/dL Final    Gamma 11/21/2023 1.7 (H)  0.5 - 1.4 g/dL Final    M-PROTEIN 1 11/21/2023 1.0 (H)    g/dL Final    Protein Electrophoresis Comment 11/21/2023 Aberrant band detected in the gamma region, consistent with the patient's known monoclonal IgG lambda at 1.0 g/dL. Unchanged from the previous analysis on 7/19/23.   Final    Path Review - Serum Protein Electr* 11/21/2023 Reviewed and approved by MANDO FARRELL on 11/23/23 at 2:20 PM.      Final     Lab on 03/25/2024   Component Date Value Ref Range Status    WBC 03/25/2024 3.7 (L)  4.4 - 11.3 x10*3/uL Final    nRBC 03/25/2024 0.0  0.0 - 0.0 /100 WBCs Final    RBC 03/25/2024 4.49 (L)  4.50 - 5.90 x10*6/uL  Final    Hemoglobin 03/25/2024 15.1  13.5 - 17.5 g/dL Final    Hematocrit 03/25/2024 45.3  41.0 - 52.0 % Final    MCV 03/25/2024 101 (H)  80 - 100 fL Final    MCH 03/25/2024 33.6  26.0 - 34.0 pg Final    MCHC 03/25/2024 33.3  32.0 - 36.0 g/dL Final    RDW 03/25/2024 13.8  11.5 - 14.5 % Final    Platelets 03/25/2024 152  150 - 450 x10*3/uL Final    Neutrophils % 03/25/2024 42.5  40.0 - 80.0 % Final    Immature Granulocytes %, Automated 03/25/2024 0.0  0.0 - 0.9 % Final    Immature Granulocyte Count (IG) includes promyelocytes, myelocytes and metamyelocytes but does not include bands. Percent differential counts (%) should be interpreted in the context of the absolute cell counts (cells/UL).    Lymphocytes % 03/25/2024 32.9  13.0 - 44.0 % Final    Monocytes % 03/25/2024 19.0  2.0 - 10.0 % Final    Eosinophils % 03/25/2024 3.5  0.0 - 6.0 % Final    Basophils % 03/25/2024 2.1  0.0 - 2.0 % Final    Neutrophils Absolute 03/25/2024 1.59  1.20 - 7.70 x10*3/uL Final    Percent differential counts (%) should be interpreted in the context of the absolute cell counts (cells/uL).    Immature Granulocytes Absolute, Au* 03/25/2024 0.00  0.00 - 0.70 x10*3/uL Final    Lymphocytes Absolute 03/25/2024 1.23  1.20 - 4.80 x10*3/uL Final    Monocytes Absolute 03/25/2024 0.71  0.10 - 1.00 x10*3/uL Final    Eosinophils Absolute 03/25/2024 0.13  0.00 - 0.70 x10*3/uL Final    Basophils Absolute 03/25/2024 0.08  0.00 - 0.10 x10*3/uL Final    Glucose 03/25/2024 94  74 - 99 mg/dL Final    Sodium 03/25/2024 142  136 - 145 mmol/L Final    Potassium 03/25/2024 3.9  3.5 - 5.3 mmol/L Final    Chloride 03/25/2024 102  98 - 107 mmol/L Final    Bicarbonate 03/25/2024 28  21 - 32 mmol/L Final    Anion Gap 03/25/2024 16  10 - 20 mmol/L Final    Urea Nitrogen 03/25/2024 17  6 - 23 mg/dL Final    Creatinine 03/25/2024 0.97  0.50 - 1.30 mg/dL Final    eGFR 03/25/2024 86  >60 mL/min/1.73m*2 Final    Calculations of estimated GFR are performed using the 2021  CKD-EPI Study Refit equation without the race variable for the IDMS-Traceable creatinine methods.  https://jasn.asnjournals.org/content/early/2021/09/22/ASN.6060416851    Calcium 03/25/2024 8.7  8.6 - 10.3 mg/dL Final    Albumin 03/25/2024 4.0  3.4 - 5.0 g/dL Final    Alkaline Phosphatase 03/25/2024 62  33 - 136 U/L Final    Total Protein 03/25/2024 7.4  6.4 - 8.2 g/dL Final    AST 03/25/2024 45 (H)  9 - 39 U/L Final    Bilirubin, Total 03/25/2024 1.4 (H)  0.0 - 1.2 mg/dL Final    ALT 03/25/2024 25  10 - 52 U/L Final    Patients treated with Sulfasalazine may generate falsely decreased results for ALT.    IgG 03/25/2024 2,110 (H)  700 - 1,600 mg/dL Final    IgA 03/25/2024 61 (L)  70 - 400 mg/dL Final    IgM 03/25/2024 49  40 - 230 mg/dL Final    Ig Kappa Free Light Chain 03/25/2024 1.85  0.33 - 1.94 mg/dL Final    Ig Lambda Free Light Chain 03/25/2024 1.32  0.57 - 2.63 mg/dL Final    Kappa/Lambda Ratio 03/25/2024 1.40  0.26 - 1.65 Final    Total Protein 03/25/2024 7.3  6.4 - 8.2 g/dL Final    Albumin 03/25/2024 4.2  3.4 - 5.0 g/dL Final    Alpha 1 Globulin 03/25/2024 0.2  0.2 - 0.6 g/dL Final    Alpha 2 Globulin 03/25/2024 0.5  0.4 - 1.1 g/dL Final    Beta Globulin 03/25/2024 0.7  0.5 - 1.2 g/dL Final    Gamma 03/25/2024 1.7 (H)  0.5 - 1.4 g/dL Final    M-PROTEIN 1 03/25/2024 1.2 (H)    g/dL Final    Protein Electrophoresis Comment 03/25/2024 Aberrant band detected. See immunofixation.     Final    Immunofixation Comment 03/25/2024 3/25/24 Known monoclonal IgG lambda in the gamma region at 1.2 g/dL. Last detected on 11/21/23 at 1.0 g/dL.   Final    Path Review - Serum Protein Electr* 03/25/2024 Reviewed and approved by EVELIN COLON on 3/30/24 at 12:18 PM.       Final    Path Review - Serum Immunofixation 03/25/2024 Reviewed and approved by EVELIN COLON on 3/30/24 at 12:18 PM.       Final   Lab on 12/28/2023   Component Date Value Ref Range Status    Prostate Specific AG 12/28/2023 0.47  <=4.00 ng/mL Final   Lab  on 11/21/2023   Component Date Value Ref Range Status    Glucose 11/21/2023 75  74 - 99 mg/dL Final    Sodium 11/21/2023 139  136 - 145 mmol/L Final    Potassium 11/21/2023 3.9  3.5 - 5.3 mmol/L Final    Chloride 11/21/2023 102  98 - 107 mmol/L Final    Bicarbonate 11/21/2023 29  21 - 32 mmol/L Final    Anion Gap 11/21/2023 12  10 - 20 mmol/L Final    Urea Nitrogen 11/21/2023 14  6 - 23 mg/dL Final    Creatinine 11/21/2023 0.92  0.50 - 1.30 mg/dL Final    eGFR 11/21/2023 >90  >60 mL/min/1.73m*2 Final    Calculations of estimated GFR are performed using the 2021 CKD-EPI Study Refit equation without the race variable for the IDMS-Traceable creatinine methods.  https://jasn.asnjournals.org/content/early/2021/09/22/ASN.2430130786    Calcium 11/21/2023 8.8  8.6 - 10.3 mg/dL Final    Albumin 11/21/2023 4.2  3.4 - 5.0 g/dL Final    Alkaline Phosphatase 11/21/2023 64  33 - 136 U/L Final    Total Protein 11/21/2023 6.9  6.4 - 8.2 g/dL Final    AST 11/21/2023 22  9 - 39 U/L Final    Bilirubin, Total 11/21/2023 1.1  0.0 - 1.2 mg/dL Final    ALT 11/21/2023 17  10 - 52 U/L Final    Patients treated with Sulfasalazine may generate falsely decreased results for ALT.    WBC 11/21/2023 4.2 (L)  4.4 - 11.3 x10*3/uL Final    nRBC 11/21/2023 0.0  0.0 - 0.0 /100 WBCs Final    RBC 11/21/2023 4.61  4.50 - 5.90 x10*6/uL Final    Hemoglobin 11/21/2023 15.3  13.5 - 17.5 g/dL Final    Hematocrit 11/21/2023 45.9  41.0 - 52.0 % Final    MCV 11/21/2023 100  80 - 100 fL Final    MCH 11/21/2023 33.2  26.0 - 34.0 pg Final    MCHC 11/21/2023 33.3  32.0 - 36.0 g/dL Final    RDW 11/21/2023 13.4  11.5 - 14.5 % Final    Platelets 11/21/2023 167  150 - 450 x10*3/uL Final    Neutrophils % 11/21/2023 56.1  40.0 - 80.0 % Final    Immature Granulocytes %, Automated 11/21/2023 0.2  0.0 - 0.9 % Final    Immature Granulocyte Count (IG) includes promyelocytes, myelocytes and metamyelocytes but does not include bands. Percent differential counts (%) should be  interpreted in the context of the absolute cell counts (cells/UL).    Lymphocytes % 11/21/2023 23.4  13.0 - 44.0 % Final    Monocytes % 11/21/2023 15.2  2.0 - 10.0 % Final    Eosinophils % 11/21/2023 2.7  0.0 - 6.0 % Final    Basophils % 11/21/2023 2.4  0.0 - 2.0 % Final    Neutrophils Absolute 11/21/2023 2.33  1.20 - 7.70 x10*3/uL Final    Percent differential counts (%) should be interpreted in the context of the absolute cell counts (cells/uL).    Immature Granulocytes Absolute, Au* 11/21/2023 0.01  0.00 - 0.70 x10*3/uL Final    Lymphocytes Absolute 11/21/2023 0.97 (L)  1.20 - 4.80 x10*3/uL Final    Monocytes Absolute 11/21/2023 0.63  0.10 - 1.00 x10*3/uL Final    Eosinophils Absolute 11/21/2023 0.11  0.00 - 0.70 x10*3/uL Final    Basophils Absolute 11/21/2023 0.10  0.00 - 0.10 x10*3/uL Final    Ig [place] Free Light Chain 11/21/2023 1.75  0.33 - 1.94 mg/dL Final    Ig Lambda Free Light Chain 11/21/2023 1.01  0.57 - 2.63 mg/dL Final    Kappa/Lambda Ratio 11/21/2023 1.73 (H)  0.26 - 1.65 Final    Total Protein 11/21/2023 7.2  6.4 - 8.2 g/dL Final    Albumin 11/21/2023 4.2  3.4 - 5.0 g/dL Final    Alpha 1 Globulin 11/21/2023 0.3  0.2 - 0.6 g/dL Final    Alpha 2 Globulin 11/21/2023 0.5  0.4 - 1.1 g/dL Final    Beta Globulin 11/21/2023 0.6  0.5 - 1.2 g/dL Final    Gamma 11/21/2023 1.7 (H)  0.5 - 1.4 g/dL Final    M-PROTEIN 1 11/21/2023 1.0 (H)    g/dL Final    Protein Electrophoresis Comment 11/21/2023 Aberrant band detected in the gamma region, consistent with the patient's known monoclonal IgG lambda at 1.0 g/dL. Unchanged from the previous analysis on 7/19/23.   Final    Path Review - Serum Protein Electr* 11/21/2023 Reviewed and approved by MANDO FARRELL on 11/23/23 at 2:20 PM.      Final       Assessment:    Smodering multiple myeloma, felt to be a high risk of progression into multiple myeloma.   started on Revlimid/decadron in 2014- see HPI for the detail Hx.    Patient with a diagnosis of smoldering multiple  myeloma currently being treated with Revlimid.  On 5 mg p.o. day 1 to day 21 of a 28-day cycle.  Review of immuno labs indicate stability of disease with no evidence of progression.  Free light chain assay today is within the normal limits including the kappa lambda ratio.      Plan:    1. Multiple myeloma, IgG lambda- smoldering(Plasma cell 5-10% in bone marrow, negative skeletal survey)  Currently on Revlimid 5 mg 21 d out of 28 d cycle.   Tolerating well except occasional diarrhea and with mild leukopenia and thrombocytopenia.   reviewed and discussed lab results with pt in detail. IgG ~ 1900, M protein 1.2 g/dL  Continue Revlimid 5 mg daily 3 weeks on one week off.   - follow Ig's, SPEP. CBC and CMP.  -Concerned about mildly elevated LFTs so will repeat in 2 months  -F/U w/PCP  -RTC in 4 months     Patient verbalized understanding, and all his questions were answered to his satisfaction     30 min spent with patient greater than 50 % of which was spent in consultation and coordination of care.

## 2024-04-26 ENCOUNTER — TELEPHONE (OUTPATIENT)
Dept: HEMATOLOGY/ONCOLOGY | Facility: CLINIC | Age: 69
End: 2024-04-26
Payer: MEDICARE

## 2024-04-29 NOTE — TELEPHONE ENCOUNTER
Pt called today as he needs to do a patient survey.  He states he will do when we hang up.   Pt needs Lenalidomide ordered.  Referred to Dr. Gomez.

## 2024-04-30 DIAGNOSIS — C90.00 MULTIPLE MYELOMA NOT HAVING ACHIEVED REMISSION (MULTI): ICD-10-CM

## 2024-04-30 RX ORDER — LENALIDOMIDE 5 MG/1
5 CAPSULE ORAL DAILY
Qty: 21 CAPSULE | Refills: 0 | Status: SHIPPED | OUTPATIENT
Start: 2024-04-30 | End: 2024-05-24 | Stop reason: SDUPTHER

## 2024-05-13 DIAGNOSIS — E03.9 HYPOTHYROIDISM, UNSPECIFIED TYPE: ICD-10-CM

## 2024-05-13 RX ORDER — LEVOTHYROXINE SODIUM 25 UG/1
25 TABLET ORAL DAILY
Qty: 60 TABLET | Refills: 0 | Status: SHIPPED | OUTPATIENT
Start: 2024-05-13

## 2024-05-24 ENCOUNTER — TELEPHONE (OUTPATIENT)
Dept: HEMATOLOGY/ONCOLOGY | Facility: CLINIC | Age: 69
End: 2024-05-24
Payer: MEDICARE

## 2024-05-24 DIAGNOSIS — C90.00 MULTIPLE MYELOMA NOT HAVING ACHIEVED REMISSION (MULTI): ICD-10-CM

## 2024-05-24 RX ORDER — LENALIDOMIDE 5 MG/1
5 CAPSULE ORAL DAILY
Qty: 21 CAPSULE | Refills: 0 | Status: SHIPPED | OUTPATIENT
Start: 2024-05-24 | End: 2024-05-30 | Stop reason: SDUPTHER

## 2024-05-25 DIAGNOSIS — I10 HYPERTENSION, UNSPECIFIED TYPE: ICD-10-CM

## 2024-05-26 DIAGNOSIS — C90.00 MULTIPLE MYELOMA NOT HAVING ACHIEVED REMISSION (MULTI): ICD-10-CM

## 2024-05-28 ENCOUNTER — TELEPHONE (OUTPATIENT)
Dept: HEMATOLOGY/ONCOLOGY | Facility: CLINIC | Age: 69
End: 2024-05-28
Payer: MEDICARE

## 2024-05-28 RX ORDER — LOSARTAN POTASSIUM 100 MG/1
100 TABLET ORAL DAILY
Qty: 90 TABLET | Refills: 0 | Status: SHIPPED | OUTPATIENT
Start: 2024-05-28

## 2024-05-29 ENCOUNTER — LAB (OUTPATIENT)
Dept: LAB | Facility: HOSPITAL | Age: 69
End: 2024-05-29
Payer: MEDICARE

## 2024-05-29 DIAGNOSIS — C90.00 MULTIPLE MYELOMA NOT HAVING ACHIEVED REMISSION (MULTI): Primary | ICD-10-CM

## 2024-05-29 DIAGNOSIS — C90.01 MULTIPLE MYELOMA IN REMISSION (MULTI): ICD-10-CM

## 2024-05-29 LAB
ALBUMIN SERPL BCP-MCNC: 3.9 G/DL (ref 3.4–5)
ALP SERPL-CCNC: 63 U/L (ref 33–136)
ALT SERPL W P-5'-P-CCNC: 19 U/L (ref 10–52)
ANION GAP SERPL CALC-SCNC: 14 MMOL/L (ref 10–20)
AST SERPL W P-5'-P-CCNC: 21 U/L (ref 9–39)
BASOPHILS # BLD AUTO: 0.03 X10*3/UL (ref 0–0.1)
BASOPHILS NFR BLD AUTO: 1.3 %
BILIRUB SERPL-MCNC: 1.1 MG/DL (ref 0–1.2)
BUN SERPL-MCNC: 14 MG/DL (ref 6–23)
CALCIUM SERPL-MCNC: 8.5 MG/DL (ref 8.6–10.3)
CHLORIDE SERPL-SCNC: 105 MMOL/L (ref 98–107)
CO2 SERPL-SCNC: 24 MMOL/L (ref 21–32)
CREAT SERPL-MCNC: 0.95 MG/DL (ref 0.5–1.3)
EGFRCR SERPLBLD CKD-EPI 2021: 88 ML/MIN/1.73M*2
EOSINOPHIL # BLD AUTO: 0.07 X10*3/UL (ref 0–0.7)
EOSINOPHIL NFR BLD AUTO: 3 %
ERYTHROCYTE [DISTWIDTH] IN BLOOD BY AUTOMATED COUNT: 13 % (ref 11.5–14.5)
GLUCOSE SERPL-MCNC: 105 MG/DL (ref 74–99)
HCT VFR BLD AUTO: 44.1 % (ref 41–52)
HGB BLD-MCNC: 14.9 G/DL (ref 13.5–17.5)
IMM GRANULOCYTES # BLD AUTO: 0 X10*3/UL (ref 0–0.7)
IMM GRANULOCYTES NFR BLD AUTO: 0 % (ref 0–0.9)
LYMPHOCYTES # BLD AUTO: 0.72 X10*3/UL (ref 1.2–4.8)
LYMPHOCYTES NFR BLD AUTO: 30.6 %
MCH RBC QN AUTO: 32.9 PG (ref 26–34)
MCHC RBC AUTO-ENTMCNC: 33.8 G/DL (ref 32–36)
MCV RBC AUTO: 97 FL (ref 80–100)
MONOCYTES # BLD AUTO: 0.31 X10*3/UL (ref 0.1–1)
MONOCYTES NFR BLD AUTO: 13.2 %
NEUTROPHILS # BLD AUTO: 1.22 X10*3/UL (ref 1.2–7.7)
NEUTROPHILS NFR BLD AUTO: 51.9 %
PLATELET # BLD AUTO: 110 X10*3/UL (ref 150–450)
POTASSIUM SERPL-SCNC: 3.5 MMOL/L (ref 3.5–5.3)
PROT SERPL-MCNC: 6.7 G/DL (ref 6.4–8.2)
RBC # BLD AUTO: 4.53 X10*6/UL (ref 4.5–5.9)
SODIUM SERPL-SCNC: 139 MMOL/L (ref 136–145)
WBC # BLD AUTO: 2.4 X10*3/UL (ref 4.4–11.3)

## 2024-05-29 PROCEDURE — 84075 ASSAY ALKALINE PHOSPHATASE: CPT | Performed by: PHYSICIAN ASSISTANT

## 2024-05-29 PROCEDURE — 85025 COMPLETE CBC W/AUTO DIFF WBC: CPT | Performed by: PHYSICIAN ASSISTANT

## 2024-05-29 PROCEDURE — 36415 COLL VENOUS BLD VENIPUNCTURE: CPT

## 2024-05-30 ENCOUNTER — TELEPHONE (OUTPATIENT)
Dept: HEMATOLOGY/ONCOLOGY | Facility: CLINIC | Age: 69
End: 2024-05-30
Payer: MEDICARE

## 2024-05-30 DIAGNOSIS — C90.00 MULTIPLE MYELOMA NOT HAVING ACHIEVED REMISSION (MULTI): ICD-10-CM

## 2024-05-30 RX ORDER — LENALIDOMIDE 5 MG/1
5 CAPSULE ORAL DAILY
Qty: 21 CAPSULE | Refills: 0 | Status: SHIPPED | OUTPATIENT
Start: 2024-05-30 | End: 2024-06-20

## 2024-05-30 NOTE — TELEPHONE ENCOUNTER
Called [facility] and was on phone 30 minutes and still no one is responding to my call after the initial conversation to see what my concern was.  Will call back and attempt to make contact again.

## 2024-05-30 NOTE — TELEPHONE ENCOUNTER
Pt informed that order was placed May 24th for medication.  Told I would call [facility] and see what is happening with refill.  Pt wanted to be sure we knew he needs to start this week.  Told I understand and will be sure to let him know what is occurrring.

## 2024-05-30 NOTE — TELEPHONE ENCOUNTER
[facility] called and they did receive fax.  They are escalating and making the refill a priority knowing pt needs to begin medication on Sunday.  Pt called and is aware that they have prescription and we will await refill.

## 2024-05-30 NOTE — TELEPHONE ENCOUNTER
Called and LM with pt letting him know I sent the script again (faxed) and will call them at 1200 to be sure it was received and able to be processed.

## 2024-05-30 NOTE — TELEPHONE ENCOUNTER
Referred to [facility] to print and fax script as pharmacy cannot see the script.  F: [phone].

## 2024-05-31 NOTE — TELEPHONE ENCOUNTER
Pt called and LM to return call.  Checking to see if [facility] was in contact with him for delivery of med.

## 2024-06-03 NOTE — TELEPHONE ENCOUNTER
It appears pt did receive his medication.  Call was left if he needed further assistance on Friday, and no call was received.

## 2024-06-10 DIAGNOSIS — I10 HYPERTENSION, UNSPECIFIED TYPE: Primary | ICD-10-CM

## 2024-06-10 RX ORDER — AMLODIPINE BESYLATE 10 MG/1
10 TABLET ORAL DAILY
Qty: 90 TABLET | Refills: 0 | Status: SHIPPED | OUTPATIENT
Start: 2024-06-10

## 2024-06-17 ENCOUNTER — APPOINTMENT (OUTPATIENT)
Dept: PRIMARY CARE | Facility: CLINIC | Age: 69
End: 2024-06-17
Payer: MEDICARE

## 2024-06-27 ENCOUNTER — TELEPHONE (OUTPATIENT)
Dept: HEMATOLOGY/ONCOLOGY | Facility: CLINIC | Age: 69
End: 2024-06-27
Payer: MEDICARE

## 2024-06-27 DIAGNOSIS — C90.00 MULTIPLE MYELOMA NOT HAVING ACHIEVED REMISSION (MULTI): ICD-10-CM

## 2024-06-27 RX ORDER — LENALIDOMIDE 5 MG/1
5 CAPSULE ORAL DAILY
Qty: 21 CAPSULE | Refills: 0 | Status: SHIPPED | OUTPATIENT
Start: 2024-06-27 | End: 2024-07-18

## 2024-07-08 DIAGNOSIS — E03.9 HYPOTHYROIDISM, UNSPECIFIED TYPE: ICD-10-CM

## 2024-07-08 RX ORDER — LEVOTHYROXINE SODIUM 25 UG/1
25 TABLET ORAL DAILY
Qty: 90 TABLET | Refills: 1 | Status: SHIPPED | OUTPATIENT
Start: 2024-07-08

## 2024-07-16 RX ORDER — LENALIDOMIDE 5 MG/1
CAPSULE ORAL
Refills: 0 | OUTPATIENT
Start: 2024-07-16

## 2024-07-24 ENCOUNTER — APPOINTMENT (OUTPATIENT)
Dept: PRIMARY CARE | Facility: CLINIC | Age: 69
End: 2024-07-24
Payer: MEDICARE

## 2024-07-24 VITALS
WEIGHT: 217.2 LBS | HEART RATE: 63 BPM | SYSTOLIC BLOOD PRESSURE: 122 MMHG | BODY MASS INDEX: 28.79 KG/M2 | HEIGHT: 73 IN | DIASTOLIC BLOOD PRESSURE: 78 MMHG | TEMPERATURE: 97.3 F | OXYGEN SATURATION: 95 %

## 2024-07-24 DIAGNOSIS — Z12.5 SCREENING PSA (PROSTATE SPECIFIC ANTIGEN): ICD-10-CM

## 2024-07-24 DIAGNOSIS — E03.9 HYPOTHYROIDISM, UNSPECIFIED TYPE: ICD-10-CM

## 2024-07-24 DIAGNOSIS — D69.6 THROMBOCYTOPENIA (CMS-HCC): ICD-10-CM

## 2024-07-24 DIAGNOSIS — C90.00 MULTIPLE MYELOMA NOT HAVING ACHIEVED REMISSION (MULTI): ICD-10-CM

## 2024-07-24 DIAGNOSIS — I10 HYPERTENSION, UNSPECIFIED TYPE: ICD-10-CM

## 2024-07-24 DIAGNOSIS — Z00.00 MEDICARE ANNUAL WELLNESS VISIT, SUBSEQUENT: Primary | ICD-10-CM

## 2024-07-24 DIAGNOSIS — E87.6 HYPOKALEMIA: ICD-10-CM

## 2024-07-24 PROCEDURE — G0439 PPPS, SUBSEQ VISIT: HCPCS | Performed by: FAMILY MEDICINE

## 2024-07-24 PROCEDURE — 3008F BODY MASS INDEX DOCD: CPT | Performed by: FAMILY MEDICINE

## 2024-07-24 PROCEDURE — 3074F SYST BP LT 130 MM HG: CPT | Performed by: FAMILY MEDICINE

## 2024-07-24 PROCEDURE — 1126F AMNT PAIN NOTED NONE PRSNT: CPT | Performed by: FAMILY MEDICINE

## 2024-07-24 PROCEDURE — 1159F MED LIST DOCD IN RCRD: CPT | Performed by: FAMILY MEDICINE

## 2024-07-24 PROCEDURE — 1170F FXNL STATUS ASSESSED: CPT | Performed by: FAMILY MEDICINE

## 2024-07-24 PROCEDURE — 3078F DIAST BP <80 MM HG: CPT | Performed by: FAMILY MEDICINE

## 2024-07-24 PROCEDURE — 1036F TOBACCO NON-USER: CPT | Performed by: FAMILY MEDICINE

## 2024-07-24 ASSESSMENT — ACTIVITIES OF DAILY LIVING (ADL)
BATHING: INDEPENDENT
DRESSING: INDEPENDENT
DOING_HOUSEWORK: INDEPENDENT
GROCERY_SHOPPING: INDEPENDENT
TAKING_MEDICATION: INDEPENDENT
MANAGING_FINANCES: INDEPENDENT

## 2024-07-24 ASSESSMENT — COLUMBIA-SUICIDE SEVERITY RATING SCALE - C-SSRS
1. IN THE PAST MONTH, HAVE YOU WISHED YOU WERE DEAD OR WISHED YOU COULD GO TO SLEEP AND NOT WAKE UP?: NO
6. HAVE YOU EVER DONE ANYTHING, STARTED TO DO ANYTHING, OR PREPARED TO DO ANYTHING TO END YOUR LIFE?: NO
2. HAVE YOU ACTUALLY HAD ANY THOUGHTS OF KILLING YOURSELF?: NO

## 2024-07-24 ASSESSMENT — ENCOUNTER SYMPTOMS
OCCASIONAL FEELINGS OF UNSTEADINESS: 0
DEPRESSION: 0
LOSS OF SENSATION IN FEET: 0

## 2024-07-24 ASSESSMENT — PAIN SCALES - GENERAL: PAINLEVEL: 0-NO PAIN

## 2024-07-24 NOTE — PROGRESS NOTES
I reviewed and examined the patient. I was present for the key exam elements, and I fully participated in the patient's care. I discussed the management of the care with the resident. I have personally reviewed the pertinent labs and imaging, as well as recent notes, with the patient. I have reviewed the note above and agree with the resident's medical decision making as documented in the resident's note, in addition to the following comments / findings:     Agree with the rest of the plan outlined below by resident physician. No red flags.      The patient understands and agrees to the assessment and plan of care. Patient has also agreed to follow up and comply with the treatment and evaluation as recommended today. Patient was instructed to call the office at [phone] should questions arise regarding their treatment or care.     Efrain Ricci DO, FAOASM  Family Medicine   [address], [address]  [place] [address]     Efrain Ricci DO

## 2024-07-24 NOTE — PROGRESS NOTES
Subjective   Patient ID: Kartik Marquez is a 68 y.o. male who presents for medicare wellness exam (Blood work orders) and Med Refill.         Reviewed all medications by prescribing practitioner or clinical pharmacist (such as prescriptions, OTCs, herbal therapies and supplements) and documented in the medical record.    HPI  1. Annual Physical exam.  Colonoscopy: colonoscopy was completed 6 years prior with a ten year clearance   Immunizations: up to date on shingles and Tdap    2. Hypothyroidism  Kartik's last TSH from one year prior was 2.16  He is currently on levothyroxine 25 mcg  Endorses chronic dry skin, but denies any worsening fatigue or constipation    3. Hypertension   Blood pressure today on intake was 150/79 with repeat of 122/78  Kartik monitors his blood pressure at home with a systolic 117 with diastolic less 80  Current antihypertensive regimen includes amlodipine 10 mg and losartan 100 mg tablets daily  Denies any cardiopulmonary symptoms such as chest pain or shortness of breath      4. 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    All other systems have been reviewed and are negative and noncontributory to this patient's current ailments.    Past Medical History:   Diagnosis Date    Hypertension     Malignant (primary) neoplasm, unspecified (Multi)     Cancer    Other conditions influencing health status     Overexertion From Lifting    Pain in unspecified limb     Limb pain    Personal history of other diseases of the circulatory system     History of hypertension    Personal history of other diseases of the musculoskeletal  system and connective tissue     History of low back pain    Personal history of other infectious and parasitic diseases     History of herpes zoster    Radiculopathy, lumbosacral region     Lumbosacral neuritis    Sprain of ligaments of lumbar spine, initial encounter     Low back sprain     Past Surgical History:   Procedure Laterality Date    OTHER SURGICAL HISTORY  09/26/2013    Uvulectomy    SHOULDER SURGERY  09/26/2013    Shoulder Surgery    TONSILLECTOMY  09/26/2013    Tonsillectomy    VASECTOMY       Social History     Tobacco Use    Smoking status: Never    Smokeless tobacco: Never   Vaping Use    Vaping status: Never Used   Substance Use Topics    Alcohol use: Never    Drug use: Never     Family History   Problem Relation Name Age of Onset    Cancer Father Amarjit      Immunization History   Administered Date(s) Administered    Pfizer COVID-19 vaccine, Fall 2023, 12 years and older, (30mcg/0.3mL) 12/01/2023    Pfizer COVID-19 vaccine, bivalent, age 12 years and older (30 mcg/0.3 mL) 11/11/2022    Pfizer Gray Cap SARS-CoV-2 04/01/2022    Pfizer Purple Cap SARS-CoV-2 03/12/2021, 04/02/2021, 08/19/2021     Current Outpatient Medications   Medication Instructions    amLODIPine (NORVASC) 10 mg, oral, Daily    aspirin 81 mg, oral, Daily    finasteride (PROSCAR) 5 mg, oral, Daily    gabapentin (NEURONTIN) 300 mg, oral, Once daily (morning) M-F (5 days a week)    hydroCHLOROthiazide (HYDRODIURIL) 25 mg, oral, Daily    lenalidomide (REVLIMID) 5 mg, oral, Daily, Take 5mg PO D1-D21 then 7D off: [facility] # [id number]    levothyroxine (SYNTHROID, LEVOXYL) 25 mcg, oral, Daily    losartan (COZAAR) 100 mg, oral, Daily    multivit-min/ferrous fumarate (MULTI VITAMIN ORAL) oral    omeprazole (PriLOSEC) 20 mg DR capsule TAKE 1 CAPSULE BY MOUTH ONCE DAILY IN THE MORNING. TAKE BEFORE MEALS. DO NOT CRUSH, CHEW, OR SPLIT.    potassium chloride CR (Klor-Con M20) 20 mEq ER tablet 30 mEq, oral, Daily     No Known Allergies    Objective  "  Vitals:    07/24/24 0938   BP: 122/78   Pulse: 63   Temp: 36.3 °C (97.3 °F)   SpO2: 95%   Weight: 98.5 kg (217 lb 3.2 oz)   Height: 1.854 m (6' 1\")     Body mass index is 28.66 kg/m².    BP Readings from Last 3 Encounters:   07/24/24 122/78   04/03/24 139/77   11/27/23 129/78      Wt Readings from Last 3 Encounters:   07/24/24 98.5 kg (217 lb 3.2 oz)   04/03/24 102 kg (225 lb 3.2 oz)   11/27/23 99.7 kg (219 lb 12.8 oz)        No visits with results within 1 Month(s) from this visit.   Latest known visit with results is:   Lab on 05/29/2024   Component Date Value    Glucose 05/29/2024 105 (H)     Sodium 05/29/2024 139     Potassium 05/29/2024 3.5     Chloride 05/29/2024 105     Bicarbonate 05/29/2024 24     Anion Gap 05/29/2024 14     Urea Nitrogen 05/29/2024 14     Creatinine 05/29/2024 0.95     eGFR 05/29/2024 88     Calcium 05/29/2024 8.5 (L)     Albumin 05/29/2024 3.9     Alkaline Phosphatase 05/29/2024 63     Total Protein 05/29/2024 6.7     AST 05/29/2024 21     Bilirubin, Total 05/29/2024 1.1     ALT 05/29/2024 19     WBC 05/29/2024 2.4 (L)     RBC 05/29/2024 4.53     Hemoglobin 05/29/2024 14.9     Hematocrit 05/29/2024 44.1     MCV 05/29/2024 97     MCH 05/29/2024 32.9     MCHC 05/29/2024 33.8     RDW 05/29/2024 13.0     Platelets 05/29/2024 110 (L)     Neutrophils % 05/29/2024 51.9     Immature Granulocytes %,* 05/29/2024 0.0     Lymphocytes % 05/29/2024 30.6     Monocytes % 05/29/2024 13.2     Eosinophils % 05/29/2024 3.0     Basophils % 05/29/2024 1.3     Neutrophils Absolute 05/29/2024 1.22     Immature Granulocytes Ab* 05/29/2024 0.00     Lymphocytes Absolute 05/29/2024 0.72 (L)     Monocytes Absolute 05/29/2024 0.31     Eosinophils Absolute 05/29/2024 0.07     Basophils Absolute 05/29/2024 0.03      Physical Exam  CONSTITUTIONAL - well nourished, well developed, looks like stated age, in no acute distress, not ill-appearing, and not tired appearing  SKIN - normal skin color and pigmentation, normal " skin turgor without rash, lesions, or nodules visualized  HEAD - no trauma, normocephalic  EYES - extraocular muscles are intact, and normal external exam  CHEST - clear to auscultation, no wheezing, no crackles and no rales, good effort  CARDIAC - regular rate and regular rhythm, no skipped beats, no murmur  ABDOMEN - no organomegaly, soft, nontender, non-distended  EXTREMITIES - no obvious or evident edema, no obvious or evident deformities  NEUROLOGICAL -  alert, oriented and no focal signs  PSYCHIATRIC - alert, pleasant and cordial, age-appropriate    Assessment/Plan   Medicare annual wellness  Yearly blood work ordered  Repeat colonoscopy in 2028    2. Hypertension w/ hx of hypokalemia   Blood pressure within goal   Continue norvasc 10 mg and Cozaar 100 mg daily    3. Hypothyroidism  Will repeat TSH at this time  Continue levothyroxine 25 mcg    4. Multiple Myeloma  Follow-up scheduled in two weeks  Continue lenalidomide 5 mg daily per protocol    Matthew Thrasher DO  PGY-2  Family Medicine

## 2024-07-25 ENCOUNTER — LAB (OUTPATIENT)
Dept: LAB | Facility: HOSPITAL | Age: 69
End: 2024-07-25
Payer: MEDICARE

## 2024-07-25 DIAGNOSIS — D69.6 THROMBOCYTOPENIA (CMS-HCC): ICD-10-CM

## 2024-07-25 DIAGNOSIS — E03.9 HYPOTHYROIDISM, UNSPECIFIED TYPE: ICD-10-CM

## 2024-07-25 DIAGNOSIS — E87.6 HYPOKALEMIA: ICD-10-CM

## 2024-07-25 DIAGNOSIS — C90.01 MULTIPLE MYELOMA IN REMISSION (MULTI): ICD-10-CM

## 2024-07-25 DIAGNOSIS — Z12.5 SCREENING PSA (PROSTATE SPECIFIC ANTIGEN): ICD-10-CM

## 2024-07-25 LAB
ALBUMIN SERPL BCP-MCNC: 3.7 G/DL (ref 3.4–5)
ALP SERPL-CCNC: 56 U/L (ref 33–136)
ALT SERPL W P-5'-P-CCNC: 22 U/L (ref 10–52)
ANION GAP SERPL CALC-SCNC: 12 MMOL/L (ref 10–20)
AST SERPL W P-5'-P-CCNC: 23 U/L (ref 9–39)
BASOPHILS # BLD AUTO: 0.06 X10*3/UL (ref 0–0.1)
BASOPHILS NFR BLD AUTO: 1.8 %
BILIRUB SERPL-MCNC: 1.2 MG/DL (ref 0–1.2)
BUN SERPL-MCNC: 14 MG/DL (ref 6–23)
CALCIUM SERPL-MCNC: 8.7 MG/DL (ref 8.6–10.3)
CHLORIDE SERPL-SCNC: 105 MMOL/L (ref 98–107)
CO2 SERPL-SCNC: 25 MMOL/L (ref 21–32)
CREAT SERPL-MCNC: 0.92 MG/DL (ref 0.5–1.3)
EGFRCR SERPLBLD CKD-EPI 2021: >90 ML/MIN/1.73M*2
EOSINOPHIL # BLD AUTO: 0.08 X10*3/UL (ref 0–0.7)
EOSINOPHIL NFR BLD AUTO: 2.4 %
ERYTHROCYTE [DISTWIDTH] IN BLOOD BY AUTOMATED COUNT: 13.3 % (ref 11.5–14.5)
GLUCOSE SERPL-MCNC: 88 MG/DL (ref 74–99)
HCT VFR BLD AUTO: 44.9 % (ref 41–52)
HGB BLD-MCNC: 15 G/DL (ref 13.5–17.5)
IGA SERPL-MCNC: 66 MG/DL (ref 70–400)
IGG SERPL-MCNC: 1690 MG/DL (ref 700–1600)
IGM SERPL-MCNC: 42 MG/DL (ref 40–230)
IMM GRANULOCYTES # BLD AUTO: 0 X10*3/UL (ref 0–0.7)
IMM GRANULOCYTES NFR BLD AUTO: 0 % (ref 0–0.9)
LYMPHOCYTES # BLD AUTO: 0.8 X10*3/UL (ref 1.2–4.8)
LYMPHOCYTES NFR BLD AUTO: 24.2 %
MCH RBC QN AUTO: 33.2 PG (ref 26–34)
MCHC RBC AUTO-ENTMCNC: 33.4 G/DL (ref 32–36)
MCV RBC AUTO: 99 FL (ref 80–100)
MONOCYTES # BLD AUTO: 0.47 X10*3/UL (ref 0.1–1)
MONOCYTES NFR BLD AUTO: 14.2 %
NEUTROPHILS # BLD AUTO: 1.89 X10*3/UL (ref 1.2–7.7)
NEUTROPHILS NFR BLD AUTO: 57.4 %
PLATELET # BLD AUTO: 104 X10*3/UL (ref 150–450)
POTASSIUM SERPL-SCNC: 3.4 MMOL/L (ref 3.5–5.3)
PROT SERPL-MCNC: 7 G/DL (ref 6.4–8.2)
PROT SERPL-MCNC: 7 G/DL (ref 6.4–8.2)
PSA SERPL-MCNC: 0.63 NG/ML
RBC # BLD AUTO: 4.52 X10*6/UL (ref 4.5–5.9)
SODIUM SERPL-SCNC: 139 MMOL/L (ref 136–145)
TSH SERPL-ACNC: 3.56 MIU/L (ref 0.44–3.98)
WBC # BLD AUTO: 3.3 X10*3/UL (ref 4.4–11.3)

## 2024-07-25 PROCEDURE — 83521 IG LIGHT CHAINS FREE EACH: CPT | Mod: GEALAB | Performed by: PHYSICIAN ASSISTANT

## 2024-07-25 PROCEDURE — 84153 ASSAY OF PSA TOTAL: CPT | Mod: GEALAB | Performed by: FAMILY MEDICINE

## 2024-07-25 PROCEDURE — 36415 COLL VENOUS BLD VENIPUNCTURE: CPT

## 2024-07-25 PROCEDURE — 85025 COMPLETE CBC W/AUTO DIFF WBC: CPT | Performed by: FAMILY MEDICINE

## 2024-07-25 PROCEDURE — 86334 IMMUNOFIX E-PHORESIS SERUM: CPT | Mod: GEALAB | Performed by: PHYSICIAN ASSISTANT

## 2024-07-25 PROCEDURE — 84165 PROTEIN E-PHORESIS SERUM: CPT | Performed by: PHYSICIAN ASSISTANT

## 2024-07-25 PROCEDURE — 82784 ASSAY IGA/IGD/IGG/IGM EACH: CPT | Mod: GEALAB | Performed by: PHYSICIAN ASSISTANT

## 2024-07-25 PROCEDURE — 86320 SERUM IMMUNOELECTROPHORESIS: CPT | Performed by: PHYSICIAN ASSISTANT

## 2024-07-25 PROCEDURE — 84443 ASSAY THYROID STIM HORMONE: CPT | Performed by: FAMILY MEDICINE

## 2024-07-25 PROCEDURE — 84155 ASSAY OF PROTEIN SERUM: CPT

## 2024-07-25 PROCEDURE — 80053 COMPREHEN METABOLIC PANEL: CPT | Performed by: FAMILY MEDICINE

## 2024-07-26 ENCOUNTER — TELEPHONE (OUTPATIENT)
Dept: HEMATOLOGY/ONCOLOGY | Facility: CLINIC | Age: 69
End: 2024-07-26
Payer: MEDICARE

## 2024-07-26 DIAGNOSIS — C90.00 MULTIPLE MYELOMA NOT HAVING ACHIEVED REMISSION (MULTI): ICD-10-CM

## 2024-07-26 LAB
KAPPA LC SERPL-MCNC: 1.75 MG/DL (ref 0.33–1.94)
KAPPA LC/LAMBDA SER: 1.31 {RATIO} (ref 0.26–1.65)
LAMBDA LC SERPL-MCNC: 1.34 MG/DL (ref 0.57–2.63)

## 2024-07-26 RX ORDER — LENALIDOMIDE 5 MG/1
5 CAPSULE ORAL DAILY
Qty: 21 CAPSULE | Refills: 0 | Status: SHIPPED | OUTPATIENT
Start: 2024-07-26 | End: 2024-08-16

## 2024-07-30 LAB
ALBUMIN: 4 G/DL (ref 3.4–5)
ALPHA 1 GLOBULIN: 0.3 G/DL (ref 0.2–0.6)
ALPHA 2 GLOBULIN: 0.5 G/DL (ref 0.4–1.1)
BETA GLOBULIN: 0.6 G/DL (ref 0.5–1.2)
GAMMA GLOBULIN: 1.6 G/DL (ref 0.5–1.4)
IMMUNOFIXATION COMMENT: ABNORMAL
M-PROTEIN 1: 1 G/DL
PATH REVIEW - SERUM IMMUNOFIXATION: ABNORMAL
PATH REVIEW-SERUM PROTEIN ELECTROPHORESIS: ABNORMAL
PROTEIN ELECTROPHORESIS COMMENT: ABNORMAL

## 2024-08-02 ENCOUNTER — OFFICE VISIT (OUTPATIENT)
Dept: HEMATOLOGY/ONCOLOGY | Facility: CLINIC | Age: 69
End: 2024-08-02
Payer: MEDICARE

## 2024-08-02 VITALS
HEART RATE: 70 BPM | WEIGHT: 217.81 LBS | RESPIRATION RATE: 16 BRPM | TEMPERATURE: 97.3 F | OXYGEN SATURATION: 95 % | SYSTOLIC BLOOD PRESSURE: 133 MMHG | DIASTOLIC BLOOD PRESSURE: 75 MMHG | BODY MASS INDEX: 28.74 KG/M2

## 2024-08-02 DIAGNOSIS — C90.01 MULTIPLE MYELOMA IN REMISSION (MULTI): ICD-10-CM

## 2024-08-02 PROCEDURE — 99214 OFFICE O/P EST MOD 30 MIN: CPT | Performed by: INTERNAL MEDICINE

## 2024-08-02 PROCEDURE — 3075F SYST BP GE 130 - 139MM HG: CPT | Performed by: INTERNAL MEDICINE

## 2024-08-02 PROCEDURE — 1159F MED LIST DOCD IN RCRD: CPT | Performed by: INTERNAL MEDICINE

## 2024-08-02 PROCEDURE — 3078F DIAST BP <80 MM HG: CPT | Performed by: INTERNAL MEDICINE

## 2024-08-02 PROCEDURE — 1126F AMNT PAIN NOTED NONE PRSNT: CPT | Performed by: INTERNAL MEDICINE

## 2024-08-02 SDOH — ECONOMIC STABILITY: FOOD INSECURITY: WITHIN THE PAST 12 MONTHS, YOU WORRIED THAT YOUR FOOD WOULD RUN OUT BEFORE YOU GOT MONEY TO BUY MORE.: NEVER TRUE

## 2024-08-02 SDOH — ECONOMIC STABILITY: FOOD INSECURITY: WITHIN THE PAST 12 MONTHS, THE FOOD YOU BOUGHT JUST DIDN'T LAST AND YOU DIDN'T HAVE MONEY TO GET MORE.: NEVER TRUE

## 2024-08-02 ASSESSMENT — PAIN SCALES - GENERAL: PAINLEVEL: 0-NO PAIN

## 2024-08-02 NOTE — PATIENT INSTRUCTIONS
Today you met with Dr. Gomez.  Recent labs were discussed and questions answered.  Dr. Gomez would like to see you again in 3months and in between now and your next visit Dr. Gomez ordered a bone marrow biopsy. You will get a call to schedule this procedure. Dr. Gomez will call you to go over the results with you. Scheduling orders were placed to reflect this.   Please do not hesitate to call our office should you have any further questions or concerns, [phone]. Thank you.

## 2024-08-02 NOTE — PROGRESS NOTES
Patient ID: Kartik Marquez is a 68 y.o. male.  Referring Physician: Yudith Franklin, TABITHA  [id number] [facility] [place] [address]  Primary Care Provider: Efrain Ricci DO  Visit Type:  Follow Up     Subjective    HPI  Patient with Smodering multiple myeloma, IgG lambda, diagnosed around 2011, felt to be a high risk of progression into multiple myeloma. started on revlimid and decadron,  off decadron since 12/9/14. M spike began to increase. responded well with resuming decadron since 3/3/15. dose of revlimid was increased to 15 mg given increased M protein since March 2015. tapered decadron to 8 mg weekly, then off in 9/2017 due to concerns  for infection. PNA in 6/2018 dose of revlimid was down to 10 mg daily 3 weeks on one week off. M protein has slowly increasing to 1.33 g/dL on 2/19/19 and 1.36 g/dL on 5/14/19, 1.7 g/dL on 11/6/19 and 1.3 g/dL on 1/8/2020. m protein down to 1.3 from 1.6  in 4/2020 and stable since then.     11/28/22: first virtual visit with me today. His Revlimid dosage was dropped from 10 mg, 3 weeks on one week off to 5 mg every day, 3 weeks on one week off. His markers has been stable. On assessment, he is doing well. Energy and appetite is stable. Peripheral  neuropathy stable/improved. He denies fever, chills, night sweats, anorexia, weight loss, CP, SOB, abd pain, n/v/d, bleeding, and any other complaints at this time.   Review of Systems   Constitutional: Negative.    HENT:  Negative.     Eyes: Negative.    Respiratory: Negative.     Cardiovascular: Negative.    Gastrointestinal: Negative.         Objective   BSA: 2.26 meters squared  /75 (BP Location: Left arm)   Pulse 70   Temp 36.3 °C (97.3 °F)   Resp 16   Wt 98.8 kg (217 lb 13 oz)   SpO2 95%   BMI 28.74 kg/m²      has a past medical history of Hypertension, Malignant (primary) neoplasm, unspecified (Multi), Other conditions influencing health status, Pain in unspecified limb, Personal history of other diseases of the circulatory  system, Personal history of other diseases of the musculoskeletal system and connective tissue, Personal history of other infectious and parasitic diseases, Radiculopathy, lumbosacral region, and Sprain of ligaments of lumbar spine, initial encounter.   has a past surgical history that includes Tonsillectomy (09/26/2013); Other surgical history (09/26/2013); Shoulder surgery (09/26/2013); and Vasectomy.  Family History   Problem Relation Name Age of Onset    Cancer Father Amarjit      Oncology History    No history exists.       Kartik Marquez  reports that he has never smoked. He has never used smokeless tobacco.  He  reports no history of alcohol use.  He  reports no history of drug use.    Physical Exam  Constitutional:       Appearance: Normal appearance.   HENT:      Head: Normocephalic and atraumatic.   Eyes:      Extraocular Movements: Extraocular movements intact.      Pupils: Pupils are equal, round, and reactive to light.   Neurological:      Mental Status: He is alert.         WBC   Date/Time Value Ref Range Status   07/25/2024 08:05 AM 3.3 (L) 4.4 - 11.3 x10*3/uL Final   05/29/2024 10:17 AM 2.4 (L) 4.4 - 11.3 x10*3/uL Final   03/25/2024 12:12 PM 3.7 (L) 4.4 - 11.3 x10*3/uL Final     nRBC   Date Value Ref Range Status   03/25/2024 0.0 0.0 - 0.0 /100 WBCs Final   11/21/2023 0.0 0.0 - 0.0 /100 WBCs Final   07/24/2023 CANCELED       Comment:     Result canceled by the ancillary.   12/30/2020 0.0 0.0 - 0.0 /100 WBC Final   08/13/2020 0.0 0.0 - 0.0 /100 WBC Final     RBC   Date Value Ref Range Status   07/25/2024 4.52 4.50 - 5.90 x10*6/uL Final   05/29/2024 4.53 4.50 - 5.90 x10*6/uL Final   03/25/2024 4.49 (L) 4.50 - 5.90 x10*6/uL Final     Hemoglobin   Date Value Ref Range Status   07/25/2024 15.0 13.5 - 17.5 g/dL Final   05/29/2024 14.9 13.5 - 17.5 g/dL Final   03/25/2024 15.1 13.5 - 17.5 g/dL Final     Hematocrit   Date Value Ref Range Status   07/25/2024 44.9 41.0 - 52.0 % Final   05/29/2024 44.1 41.0 - 52.0  % Final   03/25/2024 45.3 41.0 - 52.0 % Final     MCV   Date/Time Value Ref Range Status   07/25/2024 08:05 AM 99 80 - 100 fL Final   05/29/2024 10:17 AM 97 80 - 100 fL Final   03/25/2024 12:12  (H) 80 - 100 fL Final     MCH   Date/Time Value Ref Range Status   07/25/2024 08:05 AM 33.2 26.0 - 34.0 pg Final   05/29/2024 10:17 AM 32.9 26.0 - 34.0 pg Final   03/25/2024 12:12 PM 33.6 26.0 - 34.0 pg Final     MCHC   Date/Time Value Ref Range Status   07/25/2024 08:05 AM 33.4 32.0 - 36.0 g/dL Final   05/29/2024 10:17 AM 33.8 32.0 - 36.0 g/dL Final   03/25/2024 12:12 PM 33.3 32.0 - 36.0 g/dL Final     RDW   Date/Time Value Ref Range Status   07/25/2024 08:05 AM 13.3 11.5 - 14.5 % Final   05/29/2024 10:17 AM 13.0 11.5 - 14.5 % Final   03/25/2024 12:12 PM 13.8 11.5 - 14.5 % Final     Platelets   Date/Time Value Ref Range Status   07/25/2024 08:05  (L) 150 - 450 x10*3/uL Final   05/29/2024 10:17  (L) 150 - 450 x10*3/uL Final   03/25/2024 12:12  150 - 450 x10*3/uL Final     MPV   Date/Time Value Ref Range Status   07/31/2019 10:08 AM 10.6 7.0 - 12.6 CU Final   05/14/2019 03:51 PM 11.0 7.0 - 12.6 CU Final   02/19/2019 10:07 AM 10.5 7.0 - 12.6 CU Final     Neutrophils %   Date/Time Value Ref Range Status   07/25/2024 08:05 AM 57.4 40.0 - 80.0 % Final   05/29/2024 10:17 AM 51.9 40.0 - 80.0 % Final   03/25/2024 12:12 PM 42.5 40.0 - 80.0 % Final     Immature Granulocytes %, Automated   Date/Time Value Ref Range Status   07/25/2024 08:05 AM 0.0 0.0 - 0.9 % Final     Comment:     Immature Granulocyte Count (IG) includes promyelocytes, myelocytes and metamyelocytes but does not include bands. Percent differential counts (%) should be interpreted in the context of the absolute cell counts (cells/UL).   05/29/2024 10:17 AM 0.0 0.0 - 0.9 % Final     Comment:     Immature Granulocyte Count (IG) includes promyelocytes, myelocytes and metamyelocytes but does not include bands. Percent differential counts (%) should  be interpreted in the context of the absolute cell counts (cells/UL).   03/25/2024 12:12 PM 0.0 0.0 - 0.9 % Final     Comment:     Immature Granulocyte Count (IG) includes promyelocytes, myelocytes and metamyelocytes but does not include bands. Percent differential counts (%) should be interpreted in the context of the absolute cell counts (cells/UL).     Lymphocytes %   Date/Time Value Ref Range Status   07/25/2024 08:05 AM 24.2 13.0 - 44.0 % Final   05/29/2024 10:17 AM 30.6 13.0 - 44.0 % Final   03/25/2024 12:12 PM 32.9 13.0 - 44.0 % Final     Monocytes %   Date/Time Value Ref Range Status   07/25/2024 08:05 AM 14.2 2.0 - 10.0 % Final   05/29/2024 10:17 AM 13.2 2.0 - 10.0 % Final   03/25/2024 12:12 PM 19.0 2.0 - 10.0 % Final     Eosinophils %   Date/Time Value Ref Range Status   07/25/2024 08:05 AM 2.4 0.0 - 6.0 % Final   05/29/2024 10:17 AM 3.0 0.0 - 6.0 % Final   03/25/2024 12:12 PM 3.5 0.0 - 6.0 % Final     Basophils %   Date/Time Value Ref Range Status   07/25/2024 08:05 AM 1.8 0.0 - 2.0 % Final   05/29/2024 10:17 AM 1.3 0.0 - 2.0 % Final   03/25/2024 12:12 PM 2.1 0.0 - 2.0 % Final     Neutrophils Absolute   Date/Time Value Ref Range Status   07/25/2024 08:05 AM 1.89 1.20 - 7.70 x10*3/uL Final     Comment:     Percent differential counts (%) should be interpreted in the context of the absolute cell counts (cells/uL).   05/29/2024 10:17 AM 1.22 1.20 - 7.70 x10*3/uL Final     Comment:     Percent differential counts (%) should be interpreted in the context of the absolute cell counts (cells/uL).   03/25/2024 12:12 PM 1.59 1.20 - 7.70 x10*3/uL Final     Comment:     Percent differential counts (%) should be interpreted in the context of the absolute cell counts (cells/uL).     Immature Granulocytes Absolute, Automated   Date/Time Value Ref Range Status   07/25/2024 08:05 AM 0.00 0.00 - 0.70 x10*3/uL Final   05/29/2024 10:17 AM 0.00 0.00 - 0.70 x10*3/uL Final   03/25/2024 12:12 PM 0.00 0.00 - 0.70 x10*3/uL Final      Lymphocytes Absolute   Date/Time Value Ref Range Status   07/25/2024 08:05 AM 0.80 (L) 1.20 - 4.80 x10*3/uL Final   05/29/2024 10:17 AM 0.72 (L) 1.20 - 4.80 x10*3/uL Final   03/25/2024 12:12 PM 1.23 1.20 - 4.80 x10*3/uL Final     Monocytes Absolute   Date/Time Value Ref Range Status   07/25/2024 08:05 AM 0.47 0.10 - 1.00 x10*3/uL Final   05/29/2024 10:17 AM 0.31 0.10 - 1.00 x10*3/uL Final   03/25/2024 12:12 PM 0.71 0.10 - 1.00 x10*3/uL Final     Eosinophils Absolute   Date/Time Value Ref Range Status   07/25/2024 08:05 AM 0.08 0.00 - 0.70 x10*3/uL Final   05/29/2024 10:17 AM 0.07 0.00 - 0.70 x10*3/uL Final   03/25/2024 12:12 PM 0.13 0.00 - 0.70 x10*3/uL Final     Basophils Absolute   Date/Time Value Ref Range Status   07/25/2024 08:05 AM 0.06 0.00 - 0.10 x10*3/uL Final   05/29/2024 10:17 AM 0.03 0.00 - 0.10 x10*3/uL Final   03/25/2024 12:12 PM 0.08 0.00 - 0.10 x10*3/uL Final       Assessment/Plan      Smodering multiple myeloma, felt to be a high risk of progression into multiple myeloma.   Started on Revlimid/decadron in 2014- see HPI for the detail Hx.  03/23/2023: Patient with a diagnosis of smoldering multiple myeloma currently being treated with Revlimid.  On 5 mg p.o. day 1 to day 21 of a 28-day cycle.  Review of immuno labs indicate stability  of disease with no evidence of progression.  Free light chain assay today is within the normal limits including the kappa lambda ratio.     No need to change current regimen.  He was initially started on Revlimid plus dexamethasone but changed to single agent Revlimid if being given a maintenance regimen of 5 mg a day 1 today 21.  This is very well-tolerated tolerated and disease is well  controlled return to clinic in 4 months CBC CMP free light chain assay kappa lambda ratio.     7/24/2023: Reviewed patient's regimen and immunoglobin logical assay.  No need to  on maintenance Revlimid.  It is well-tolerated.  We will continue to monitor q. for  monthly CBC CMP  SPEP and free light chain assay.  Discussed with patient and he is in agreement.  He is to return to clinic and be reviewed every 4 months  : this plan is also acceptable to patient.    8/2/2024: Progressive Thrombocytopenia: SMM: Will order BMBX: RTC 3 months.     Diagnoses and all orders for this visit:  Multiple myeloma in remission (Multi)  -     Clinic Appointment Request [facility]  -     Serum Protein Electrophoresis; Standing  -     [place]/Lambda Free Light Chain, Serum; Standing  -     Immunoglobulins (IgG, IgA, IgM); Standing  -     CBC and Auto Differential; Standing  -     Comprehensive Metabolic Panel; Standing  -     Bone Marrow Evaluation; Future  -     Clinic Appointment Request Follow Up; [facility]; Future  -     CT bone marrow biopsy and aspirations only; Future           Symone Hicks MD

## 2024-08-05 ASSESSMENT — ENCOUNTER SYMPTOMS
EYES NEGATIVE: 1
GASTROINTESTINAL NEGATIVE: 1
CONSTITUTIONAL NEGATIVE: 1
CARDIOVASCULAR NEGATIVE: 1
RESPIRATORY NEGATIVE: 1

## 2024-08-22 ENCOUNTER — HOSPITAL ENCOUNTER (OUTPATIENT)
Dept: RADIOLOGY | Facility: HOSPITAL | Age: 69
Discharge: HOME | End: 2024-08-22
Payer: MEDICARE

## 2024-08-22 VITALS
DIASTOLIC BLOOD PRESSURE: 76 MMHG | SYSTOLIC BLOOD PRESSURE: 141 MMHG | OXYGEN SATURATION: 98 % | HEART RATE: 46 BPM | RESPIRATION RATE: 18 BRPM

## 2024-08-22 DIAGNOSIS — C90.01 MULTIPLE MYELOMA IN REMISSION (MULTI): ICD-10-CM

## 2024-08-22 LAB
BASOPHILS # BLD AUTO: 0.07 X10*3/UL (ref 0–0.1)
BASOPHILS NFR BLD AUTO: 2.7 %
EOSINOPHIL # BLD AUTO: 0.14 X10*3/UL (ref 0–0.7)
EOSINOPHIL NFR BLD AUTO: 5.3 %
ERYTHROCYTE [DISTWIDTH] IN BLOOD BY AUTOMATED COUNT: 13.3 % (ref 11.5–14.5)
HCT VFR BLD AUTO: 44.7 % (ref 41–52)
HGB BLD-MCNC: 15.1 G/DL (ref 13.5–17.5)
IMM GRANULOCYTES # BLD AUTO: 0 X10*3/UL (ref 0–0.7)
IMM GRANULOCYTES NFR BLD AUTO: 0 % (ref 0–0.9)
LYMPHOCYTES # BLD AUTO: 0.7 X10*3/UL (ref 1.2–4.8)
LYMPHOCYTES NFR BLD AUTO: 26.6 %
MCH RBC QN AUTO: 33.2 PG (ref 26–34)
MCHC RBC AUTO-ENTMCNC: 33.8 G/DL (ref 32–36)
MCV RBC AUTO: 98 FL (ref 80–100)
MONOCYTES # BLD AUTO: 0.42 X10*3/UL (ref 0.1–1)
MONOCYTES NFR BLD AUTO: 16 %
NEUTROPHILS # BLD AUTO: 1.3 X10*3/UL (ref 1.2–7.7)
NEUTROPHILS NFR BLD AUTO: 49.4 %
NRBC BLD-RTO: 0 /100 WBCS (ref 0–0)
PLATELET # BLD AUTO: 131 X10*3/UL (ref 150–450)
RBC # BLD AUTO: 4.55 X10*6/UL (ref 4.5–5.9)
WBC # BLD AUTO: 2.6 X10*3/UL (ref 4.4–11.3)

## 2024-08-22 PROCEDURE — C1830 POWER BONE MARROW BX NEEDLE: HCPCS

## 2024-08-22 PROCEDURE — 88342 IMHCHEM/IMCYTCHM 1ST ANTB: CPT | Performed by: PATHOLOGY

## 2024-08-22 PROCEDURE — 88313 SPECIAL STAINS GROUP 2: CPT | Performed by: PATHOLOGY

## 2024-08-22 PROCEDURE — 88185 FLOWCYTOMETRY/TC ADD-ON: CPT | Mod: TC,GEALAB | Performed by: INTERNAL MEDICINE

## 2024-08-22 PROCEDURE — 2720000007 HC OR 272 NO HCPCS

## 2024-08-22 PROCEDURE — 88280 CHROMOSOME KARYOTYPE STUDY: CPT | Performed by: INTERNAL MEDICINE

## 2024-08-22 PROCEDURE — 88365 INSITU HYBRIDIZATION (FISH): CPT | Performed by: PATHOLOGY

## 2024-08-22 PROCEDURE — 85097 BONE MARROW INTERPRETATION: CPT | Mod: GEALAB | Performed by: INTERNAL MEDICINE

## 2024-08-22 PROCEDURE — 88305 TISSUE EXAM BY PATHOLOGIST: CPT | Performed by: PATHOLOGY

## 2024-08-22 PROCEDURE — 36415 COLL VENOUS BLD VENIPUNCTURE: CPT | Performed by: INTERNAL MEDICINE

## 2024-08-22 PROCEDURE — 81450 HL NEO GSAP 5-50DNA/DNA&RNA: CPT | Performed by: INTERNAL MEDICINE

## 2024-08-22 PROCEDURE — 77012 CT SCAN FOR NEEDLE BIOPSY: CPT

## 2024-08-22 PROCEDURE — 88189 FLOWCYTOMETRY/READ 16 & >: CPT | Performed by: PATHOLOGY

## 2024-08-22 PROCEDURE — 88341 IMHCHEM/IMCYTCHM EA ADD ANTB: CPT | Performed by: PATHOLOGY

## 2024-08-22 PROCEDURE — 85025 COMPLETE CBC W/AUTO DIFF WBC: CPT | Performed by: INTERNAL MEDICINE

## 2024-08-22 PROCEDURE — G0452 MOLECULAR PATHOLOGY INTERPR: HCPCS | Performed by: INTERNAL MEDICINE

## 2024-08-22 PROCEDURE — 81229 CYTOG ALYS CHRML ABNR SNPCGH: CPT | Performed by: INTERNAL MEDICINE

## 2024-08-22 PROCEDURE — 88342 IMHCHEM/IMCYTCHM 1ST ANTB: CPT | Mod: TC,59,SUR,GEALAB | Performed by: INTERNAL MEDICINE

## 2024-08-22 PROCEDURE — 88291 CYTO/MOLECULAR REPORT: CPT | Performed by: PATHOLOGY

## 2024-08-22 PROCEDURE — 88311 DECALCIFY TISSUE: CPT | Performed by: PATHOLOGY

## 2024-08-22 PROCEDURE — 88184 FLOWCYTOMETRY/ TC 1 MARKER: CPT | Mod: TC,GEALAB | Performed by: INTERNAL MEDICINE

## 2024-08-22 PROCEDURE — 88275 CYTOGENETICS 100-300: CPT | Performed by: INTERNAL MEDICINE

## 2024-08-22 PROCEDURE — 88305 TISSUE EXAM BY PATHOLOGIST: CPT | Mod: TC,SUR,GEALAB | Performed by: INTERNAL MEDICINE

## 2024-08-22 ASSESSMENT — PAIN SCALES - GENERAL: PAINLEVEL_OUTOF10: 0 - NO PAIN

## 2024-08-22 ASSESSMENT — PAIN - FUNCTIONAL ASSESSMENT: PAIN_FUNCTIONAL_ASSESSMENT: 0-10

## 2024-08-22 NOTE — DISCHARGE INSTRUCTIONS
Okay to remove band-aid tomorrow  Okay to shower tomorrow  Okay to use ice as needed for pain  Okay to take tylenol as needed for pain  Follow up with Dr. Gomez in 2-4 weeks for results  Notify MD with any s/s of infection or active bleeding

## 2024-08-22 NOTE — PRE-PROCEDURE NOTE
Interventional Radiology Preprocedure Note    Indication for procedure: The encounter diagnosis was Multiple myeloma in remission (Multi).    Relevant review of systems: NA    Relevant Labs:   Lab Results   Component Value Date    CREATININE 0.92 07/25/2024    EGFR >90 07/25/2024    INR 1.1 06/04/2018    PROTIME 11.9 06/04/2018       Planned Sedation/Anesthesia: None    Airway assessment: N/A    Directed physical examination:    N/A    Mallampati: N/A    ASA Score: ASA 2 - Patient with mild systemic disease with no functional limitations    Benefits, risks and alternatives of procedure and planned sedation have been discussed with the patient and/or their representative. All questions answered and they agree to proceed.

## 2024-08-22 NOTE — POST-PROCEDURE NOTE
Interventional Radiology Brief Postprocedure Note    Attending: Eunice Lackey MD      Assistant: None    Diagnosis: Smoldering Multiple Myeloma    Description of procedure: The patient was placed in [prone] position on the interventional CT scanner bed. An initial  image and axial noncontrast CT images of the pelvis were obtained. Imaging findings are discussed below. A posterior percutaneous approach was chosen for biopsy of the  [left] iliac. The skin site was marked, prepped, and draped in usual sterile fashion. Local anesthesia of the skin and deep tissues was administered with 1% lidocaine.     An 11 gauge coaxial needle system was advanced to the iliac and seated within the bone. Initial marrow aspirate was obtained, which was found to contain spicules. Then, multiple additional aspirates were obtained in heparinized and non-heparinized syringes. Finally, core biopsy was performed through the coaxial system using a powered 13 gauge biopsy needle ([one core specimen obtained]). The needles were removed and sterile dressing applied.      Samples were deemed adequate by the bone marrow pathology team in the procedure area and taken for analysis.     Anesthesia:  Local    Complications: None    Estimated Blood Loss: none    Medications (Filter: Administrations occurring from 0939 to 0958 on 08/22/24)      None          No specimens collected      See detailed result report with images in PACS.    The patient tolerated the procedure well without incident or complication and is in stable condition.

## 2024-08-23 ENCOUNTER — TELEPHONE (OUTPATIENT)
Dept: HEMATOLOGY/ONCOLOGY | Facility: CLINIC | Age: 69
End: 2024-08-23
Payer: MEDICARE

## 2024-08-23 DIAGNOSIS — C90.00 MULTIPLE MYELOMA NOT HAVING ACHIEVED REMISSION (MULTI): ICD-10-CM

## 2024-08-23 DIAGNOSIS — I10 HYPERTENSION, UNSPECIFIED TYPE: ICD-10-CM

## 2024-08-23 LAB
CELL POPULATIONS: NORMAL
DIAGNOSIS: NORMAL
FLOW DIFFERENTIAL: NORMAL
FLOW TEST ORDERED: NORMAL
LAB TEST METHOD: NORMAL
NUMBER OF CELLS COLLECTED: NORMAL PER TUBE
PATH REPORT.TOTAL CANCER: NORMAL
SIGNATURE COMMENT: NORMAL
SPECIMEN VIABILITY: NORMAL

## 2024-08-23 RX ORDER — LOSARTAN POTASSIUM 100 MG/1
100 TABLET ORAL DAILY
Qty: 90 TABLET | Refills: 0 | Status: SHIPPED | OUTPATIENT
Start: 2024-08-23

## 2024-08-23 RX ORDER — LENALIDOMIDE 5 MG/1
5 CAPSULE ORAL DAILY
Qty: 21 CAPSULE | Refills: 0 | Status: SHIPPED | OUTPATIENT
Start: 2024-08-23 | End: 2024-09-13

## 2024-08-28 ENCOUNTER — TELEPHONE (OUTPATIENT)
Dept: HEMATOLOGY/ONCOLOGY | Facility: CLINIC | Age: 69
End: 2024-08-28
Payer: MEDICARE

## 2024-08-28 DIAGNOSIS — C90.00 MULTIPLE MYELOMA NOT HAVING ACHIEVED REMISSION (MULTI): ICD-10-CM

## 2024-08-28 LAB
ELECTRONICALLY SIGNED BY: NORMAL
MYELOID NGS RESULTS: NORMAL
PATH REPORT.COMMENTS IMP SPEC: NORMAL
PATH REPORT.FINAL DX SPEC: NORMAL
PATH REPORT.GROSS SPEC: NORMAL
PATH REPORT.MICROSCOPIC SPEC OTHER STN: NORMAL
PATH REPORT.RELEVANT HX SPEC: NORMAL
PATH REPORT.TOTAL CANCER: NORMAL

## 2024-08-28 RX ORDER — LENALIDOMIDE 5 MG/1
5 CAPSULE ORAL DAILY
Qty: 21 CAPSULE | Refills: 0 | Status: SHIPPED | OUTPATIENT
Start: 2024-08-28 | End: 2024-09-18

## 2024-08-28 NOTE — TELEPHONE ENCOUNTER
Referred to FLACO MCINTYRE.  Informed FLACO MCINTYRE that [facility] is saying they did not receive order for Lenalidomide.  Requested she resend and print so we can also fax to pharmacy.

## 2024-08-28 NOTE — TELEPHONE ENCOUNTER
Called [facility] and confirmed that they did NOT receive the recently written prescription for Lenalidomide.

## 2024-08-28 NOTE — TELEPHONE ENCOUNTER
Prescription printed and signed per FLACO Franklin PAC.  Prescription faxed to [facility].      Pt called and told we will fax to both phone numbers, that it was ordered 8/23 and thanked him for informing us as we did not know it did not go through electronically.  Pt states he saw it was ordered.  Ensured we have now informed management and they will look to see what the difficulty is.  He states [facility] told him some other people have had difficulty sending their electronic orders to them also.  Pt needs medication sent by Tuesday.  Told I will check again and be sure they received order later in day.  He states he may call also.  Asked to let us know if he hears they do not have the prescription.  He states he will.

## 2024-08-29 NOTE — TELEPHONE ENCOUNTER
Called pharmacy and confirmed they do have the prescription and pharmacist said it is almost ready for delivery.  Called and LM that pharmacy received prescription and is almost ready and they should be calling soon.

## 2024-09-08 DIAGNOSIS — I10 HYPERTENSION, UNSPECIFIED TYPE: ICD-10-CM

## 2024-09-09 ENCOUNTER — TELEPHONE (OUTPATIENT)
Dept: HEMATOLOGY/ONCOLOGY | Facility: CLINIC | Age: 69
End: 2024-09-09
Payer: MEDICARE

## 2024-09-09 DIAGNOSIS — C90.01 MULTIPLE MYELOMA IN REMISSION (MULTI): ICD-10-CM

## 2024-09-09 DIAGNOSIS — C90.00 MULTIPLE MYELOMA NOT HAVING ACHIEVED REMISSION (MULTI): ICD-10-CM

## 2024-09-09 RX ORDER — AMLODIPINE BESYLATE 10 MG/1
10 TABLET ORAL DAILY
Qty: 90 TABLET | Refills: 1 | Status: SHIPPED | OUTPATIENT
Start: 2024-09-09

## 2024-09-09 NOTE — TELEPHONE ENCOUNTER
Pt called and informed all results are not back from Bone Marrow Biopsy.  Asked to return call on Friday and we will check if results are complete.  Pt agreed to plan and states he will call back then.

## 2024-09-13 DIAGNOSIS — Z09 ENCOUNTER FOR FOLLOW-UP EXAMINATION AFTER COMPLETED TREATMENT FOR CONDITIONS OTHER THAN MALIGNANT NEOPLASM: ICD-10-CM

## 2024-09-13 DIAGNOSIS — C90.00 MULTIPLE MYELOMA NOT HAVING ACHIEVED REMISSION (MULTI): ICD-10-CM

## 2024-09-13 NOTE — TELEPHONE ENCOUNTER
Pt called and told, per Dr. Gomez, that his diagnosis is one of smoldering myeloma, a condition that requires observation, labs every 3 months.  Labs entered for prior to MD visit as last done in August.  (Appt scheduled for Nov. 7)  He also is to have a Dexa bone scan prior to MD appt.  Pt educated on all of these and he states he will call back to make appt as he is unable to do at present.  Pt also was told Dr. Gomez will go over all results at his upcoming appt.  Pt is agreeable to this.

## 2024-09-16 LAB
CHROM ANALY OVERALL INTERP-IMP: NORMAL
ELECTRONICALLY COSIGNED BY CYTOGENETICS: NORMAL
ELECTRONICALLY SIGNED BY CYTOGENETICS: NORMAL
FISH ISCN RESULTS: NORMAL

## 2024-09-17 LAB
BAND RESOLUTION: 0 BANDS
CHROM ANALY OVERALL INTERP-IMP: NORMAL
CHROMOSOME ANALYSIS CELLS ANALYZED: 0 CELLS
CHROMOSOME ANALYSIS CELLS IMAGED: 0 CELLS
CHROMOSOME ANALYSIS HYPERMODAL CELL COUNT: 0 CELLS
CHROMOSOME ANALYSIS HYPOMODAL CELL COUNT: 0 CELLS
CHROMOSOME ANALYSIS MODAL CHROMOSOME NO: 0 CHROMOSOMES
CHROMOSOME ANALYSIS STAINING METHOD: NORMAL
ELECTRONICALLY SIGNED BY CYTOGENETICS: NORMAL
KARYOTYP MAR: 0 CELLS
TOTAL CELLS COUNTED MAR: 0 CELLS

## 2024-09-18 NOTE — TELEPHONE ENCOUNTER
Pt called and reminded that he needed to have labs drawn prior to his MD appointment on 11/7/24.  Pt is scheduled to have Dexa scan on 10/23 and he states he will have lab draw done on that same day.  Offered support for his plan.

## 2024-09-19 ENCOUNTER — TELEPHONE (OUTPATIENT)
Dept: HEMATOLOGY/ONCOLOGY | Facility: CLINIC | Age: 69
End: 2024-09-19
Payer: MEDICARE

## 2024-09-19 NOTE — TELEPHONE ENCOUNTER
Pt called and informed his [employer] authorization number can't be done until Sept. 22.  Told we will be able to do this Monday.  Discussed how we need to FAX order over to [facility] so hopefully will have it sent quicker.  Pt verbalized appreciation.

## 2024-09-20 LAB
ELECTRONICALLY SIGNED BY CYTOGENETICS: NORMAL
MICROARRAY PLATFORM: NORMAL

## 2024-09-23 ENCOUNTER — TELEPHONE (OUTPATIENT)
Dept: HEMATOLOGY/ONCOLOGY | Facility: CLINIC | Age: 69
End: 2024-09-23
Payer: MEDICARE

## 2024-09-23 NOTE — TELEPHONE ENCOUNTER
Fax cover made and given to Nurse Partner to fax when Dr. Gomez prints Lenalidomide prescription.      Pt called and informed that we are working on getting prescription.  Explained we were not able to apply for Authorization number until yesterday (Sunday when we were not here), but it was done this morning and should be faxed tomorrow.  Pt verbalized understanding, anxious to have this sent out.

## 2024-09-23 NOTE — TELEPHONE ENCOUNTER
See note from 9/19.  Authorization number and request sent to Dr. Gomez earlier today for refill.

## 2024-09-24 DIAGNOSIS — C90.00 MULTIPLE MYELOMA NOT HAVING ACHIEVED REMISSION (MULTI): ICD-10-CM

## 2024-09-24 RX ORDER — LENALIDOMIDE 5 MG/1
5 CAPSULE ORAL DAILY
Qty: 21 CAPSULE | Refills: 0 | Status: SHIPPED | OUTPATIENT
Start: 2024-09-24 | End: 2024-09-25 | Stop reason: SDUPTHER

## 2024-09-25 ENCOUNTER — TELEPHONE (OUTPATIENT)
Dept: HEMATOLOGY/ONCOLOGY | Facility: CLINIC | Age: 69
End: 2024-09-25
Payer: MEDICARE

## 2024-09-25 DIAGNOSIS — C90.00 MULTIPLE MYELOMA NOT HAVING ACHIEVED REMISSION (MULTI): ICD-10-CM

## 2024-09-25 RX ORDER — LENALIDOMIDE 5 MG/1
5 CAPSULE ORAL DAILY
Qty: 21 CAPSULE | Refills: 0 | Status: SHIPPED | OUTPATIENT
Start: 2024-09-25 | End: 2024-09-25 | Stop reason: SDUPTHER

## 2024-09-25 RX ORDER — LENALIDOMIDE 5 MG/1
5 CAPSULE ORAL DAILY
Qty: 7 CAPSULE | Refills: 2 | Status: SHIPPED | OUTPATIENT
Start: 2024-09-25 | End: 2024-09-26 | Stop reason: SDUPTHER

## 2024-09-25 NOTE — TELEPHONE ENCOUNTER
Returned call to [facility] and they still do not have the fax.  Success was registered when it was faxed.  She told me to fax it to 2 other numbers, which I am going to do now.  She states they will look for it this evening as they work until 9 pm.

## 2024-09-25 NOTE — TELEPHONE ENCOUNTER
Per JANINA Cedeño, she did call to order Lenalidomide.  They said they can not do a verbal order as that option was during Covid and it has .  She re-sent the E-scribed order as they told her she could do that.  We will return call to [facility] and see if they have received prescription soon.  (Allowing some time for processing)

## 2024-09-25 NOTE — TELEPHONE ENCOUNTER
[facility] and was able to speak to a pharmacist who assisted in giving me a specific fax number as well as a direct phone number and an address for escript that might correct the issue of the pharmacy not receiving our scripts.  Pt called and updated on all of this.  We are hopeful they will call and this evening or tomorrow morning and arrange deliver for pt.

## 2024-09-25 NOTE — TELEPHONE ENCOUNTER
Given information pt stating [facility] pharmacy still does not have prescription, called [facility].  They state they do not have script.  When asked how to expedite this so pt does not miss a dose, they state we can have provider call in prescription.  JANINA MCINTYRE agreed to call in script.  Pt called and given information that the prescription was faxed yesterday, success response received and that [facility], they recommend prescription to be verbally ordered, which it will be done soon.  Pt verbalizing frustration at this process, that in the past with the same pharmacy they used to process so much quicker and now they do not.  Told I will be following this today and we can talk more later, hoping the pharmacy will call with a delivery time for him today.  Pt verbalizing that he is agreeable to plan.

## 2024-09-26 DIAGNOSIS — C90.00 MULTIPLE MYELOMA NOT HAVING ACHIEVED REMISSION (MULTI): ICD-10-CM

## 2024-09-26 RX ORDER — LENALIDOMIDE 5 MG/1
5 CAPSULE ORAL DAILY
Qty: 21 CAPSULE | Refills: 0 | Status: SHIPPED | OUTPATIENT
Start: 2024-09-26 | End: 2024-10-17

## 2024-09-26 NOTE — TELEPHONE ENCOUNTER
Called [facility] and spoke to pharmacist.  Confirmed that prescription was received, but it needed some updates to it.  FLACO Franklin PAC, called pharmacy and provided updates as well as rewrote prescription so it is correct.  Prescription faxed with updates as well.  Will call pt shortly to see if pharmacy has contacted him.     Pt called and was concerned that updates needed to be made.  Assured they were actually in progress as we were speaking.  Told we can talk again today to assure timely response from [facility].  Pt in agreement with plan and verbalized appreciation for assistance.

## 2024-09-26 NOTE — TELEPHONE ENCOUNTER
Returned call to pharmacy and spoke to pharmacist who completed needed items and prescription is ready to be sent.  He states pt is able to call and request delivery or he can wait until they call.  Pt called and told his prescription was ready to be sent, just needed him to call.  Pt states he will call now and have them send medication to him.

## 2024-10-08 ENCOUNTER — APPOINTMENT (OUTPATIENT)
Dept: RADIOLOGY | Facility: HOSPITAL | Age: 69
End: 2024-10-08
Payer: MEDICARE

## 2024-10-11 DIAGNOSIS — C90.00 MULTIPLE MYELOMA NOT HAVING ACHIEVED REMISSION (MULTI): ICD-10-CM

## 2024-10-11 DIAGNOSIS — E87.6 HYPOKALEMIA: ICD-10-CM

## 2024-10-17 RX ORDER — LENALIDOMIDE 5 MG/1
5 CAPSULE ORAL DAILY
Qty: 21 CAPSULE | Refills: 0 | Status: SHIPPED | OUTPATIENT
Start: 2024-10-17 | End: 2024-11-07

## 2024-10-17 RX ORDER — POTASSIUM CHLORIDE 1500 MG/1
30 TABLET, EXTENDED RELEASE ORAL DAILY
Qty: 135 TABLET | Refills: 3 | Status: SHIPPED | OUTPATIENT
Start: 2024-10-17

## 2024-10-21 ENCOUNTER — TELEPHONE (OUTPATIENT)
Dept: HEMATOLOGY/ONCOLOGY | Facility: CLINIC | Age: 69
End: 2024-10-21
Payer: MEDICARE

## 2024-10-21 DIAGNOSIS — C90.00 MULTIPLE MYELOMA NOT HAVING ACHIEVED REMISSION (MULTI): ICD-10-CM

## 2024-10-21 RX ORDER — LENALIDOMIDE 5 MG/1
5 CAPSULE ORAL DAILY
Qty: 21 CAPSULE | Refills: 0 | Status: SHIPPED | OUTPATIENT
Start: 2024-10-21 | End: 2024-10-21 | Stop reason: SDUPTHER

## 2024-10-21 RX ORDER — LENALIDOMIDE 5 MG/1
5 CAPSULE ORAL DAILY
Qty: 21 CAPSULE | Refills: 0 | Status: SHIPPED | OUTPATIENT
Start: 2024-10-21 | End: 2024-11-11

## 2024-10-21 NOTE — TELEPHONE ENCOUNTER
Paper Revlamid Rx from Dr Gomez Fax'd to "[facility] and Fax was confirmed. Pt updated. He will call back tomorrow am if he does not hear from "[facility]. No other issues or concerns identified with him at this time.

## 2024-10-23 ENCOUNTER — LAB (OUTPATIENT)
Dept: LAB | Facility: HOSPITAL | Age: 69
End: 2024-10-23
Payer: MEDICARE

## 2024-10-23 ENCOUNTER — HOSPITAL ENCOUNTER (OUTPATIENT)
Dept: RADIOLOGY | Facility: HOSPITAL | Age: 69
Discharge: HOME | End: 2024-10-23
Payer: MEDICARE

## 2024-10-23 DIAGNOSIS — Z09 ENCOUNTER FOR FOLLOW-UP EXAMINATION AFTER COMPLETED TREATMENT FOR CONDITIONS OTHER THAN MALIGNANT NEOPLASM: ICD-10-CM

## 2024-10-23 DIAGNOSIS — C90.01 MULTIPLE MYELOMA IN REMISSION (MULTI): ICD-10-CM

## 2024-10-23 DIAGNOSIS — C90.00 MULTIPLE MYELOMA NOT HAVING ACHIEVED REMISSION (MULTI): ICD-10-CM

## 2024-10-23 LAB
ALBUMIN SERPL BCP-MCNC: 3.5 G/DL (ref 3.4–5)
ALP SERPL-CCNC: 53 U/L (ref 33–136)
ALT SERPL W P-5'-P-CCNC: 21 U/L (ref 10–52)
ANION GAP SERPL CALC-SCNC: 11 MMOL/L (ref 10–20)
AST SERPL W P-5'-P-CCNC: 23 U/L (ref 9–39)
BASOPHILS # BLD AUTO: 0.03 X10*3/UL (ref 0–0.1)
BASOPHILS NFR BLD AUTO: 1.4 %
BILIRUB SERPL-MCNC: 1 MG/DL (ref 0–1.2)
BUN SERPL-MCNC: 14 MG/DL (ref 6–23)
CALCIUM SERPL-MCNC: 8.4 MG/DL (ref 8.6–10.3)
CHLORIDE SERPL-SCNC: 105 MMOL/L (ref 98–107)
CO2 SERPL-SCNC: 26 MMOL/L (ref 21–32)
CREAT SERPL-MCNC: 0.87 MG/DL (ref 0.5–1.3)
EGFRCR SERPLBLD CKD-EPI 2021: >90 ML/MIN/1.73M*2
EOSINOPHIL # BLD AUTO: 0.03 X10*3/UL (ref 0–0.7)
EOSINOPHIL NFR BLD AUTO: 1.4 %
ERYTHROCYTE [DISTWIDTH] IN BLOOD BY AUTOMATED COUNT: 13 % (ref 11.5–14.5)
GLUCOSE SERPL-MCNC: 109 MG/DL (ref 74–99)
HCT VFR BLD AUTO: 43.9 % (ref 41–52)
HGB BLD-MCNC: 14.6 G/DL (ref 13.5–17.5)
IGA SERPL-MCNC: 58 MG/DL (ref 70–400)
IGG SERPL-MCNC: 1830 MG/DL (ref 700–1600)
IGM SERPL-MCNC: 41 MG/DL (ref 40–230)
IMM GRANULOCYTES # BLD AUTO: 0 X10*3/UL (ref 0–0.7)
IMM GRANULOCYTES NFR BLD AUTO: 0 % (ref 0–0.9)
LYMPHOCYTES # BLD AUTO: 0.75 X10*3/UL (ref 1.2–4.8)
LYMPHOCYTES NFR BLD AUTO: 34.2 %
MCH RBC QN AUTO: 32.8 PG (ref 26–34)
MCHC RBC AUTO-ENTMCNC: 33.3 G/DL (ref 32–36)
MCV RBC AUTO: 99 FL (ref 80–100)
MONOCYTES # BLD AUTO: 0.26 X10*3/UL (ref 0.1–1)
MONOCYTES NFR BLD AUTO: 11.9 %
NEUTROPHILS # BLD AUTO: 1.12 X10*3/UL (ref 1.2–7.7)
NEUTROPHILS NFR BLD AUTO: 51.1 %
PLATELET # BLD AUTO: 107 X10*3/UL (ref 150–450)
POTASSIUM SERPL-SCNC: 3.5 MMOL/L (ref 3.5–5.3)
PROT SERPL-MCNC: 6.7 G/DL (ref 6.4–8.2)
PROT SERPL-MCNC: 6.9 G/DL (ref 6.4–8.2)
RBC # BLD AUTO: 4.45 X10*6/UL (ref 4.5–5.9)
SODIUM SERPL-SCNC: 138 MMOL/L (ref 136–145)
WBC # BLD AUTO: 2.2 X10*3/UL (ref 4.4–11.3)

## 2024-10-23 PROCEDURE — 83521 IG LIGHT CHAINS FREE EACH: CPT

## 2024-10-23 PROCEDURE — 85025 COMPLETE CBC W/AUTO DIFF WBC: CPT

## 2024-10-23 PROCEDURE — 82784 ASSAY IGA/IGD/IGG/IGM EACH: CPT

## 2024-10-23 PROCEDURE — 80053 COMPREHEN METABOLIC PANEL: CPT

## 2024-10-23 PROCEDURE — 84165 PROTEIN E-PHORESIS SERUM: CPT

## 2024-10-23 PROCEDURE — 36415 COLL VENOUS BLD VENIPUNCTURE: CPT

## 2024-10-23 PROCEDURE — 84155 ASSAY OF PROTEIN SERUM: CPT

## 2024-10-23 PROCEDURE — 77080 DXA BONE DENSITY AXIAL: CPT | Performed by: STUDENT IN AN ORGANIZED HEALTH CARE EDUCATION/TRAINING PROGRAM

## 2024-10-23 PROCEDURE — 77080 DXA BONE DENSITY AXIAL: CPT

## 2024-10-24 LAB
ALBUMIN: 3.9 G/DL (ref 3.4–5)
ALPHA 1 GLOBULIN: 0.2 G/DL (ref 0.2–0.6)
ALPHA 2 GLOBULIN: 0.5 G/DL (ref 0.4–1.1)
BETA GLOBULIN: 0.6 G/DL (ref 0.5–1.2)
GAMMA GLOBULIN: 1.6 G/DL (ref 0.5–1.4)
KAPPA LC SERPL-MCNC: 1.44 MG/DL (ref 0.33–1.94)
KAPPA LC/LAMBDA SER: 1.2 {RATIO} (ref 0.26–1.65)
LAMBDA LC SERPL-MCNC: 1.2 MG/DL (ref 0.57–2.63)
M-PROTEIN 1: 1 G/DL
PATH REVIEW-SERUM PROTEIN ELECTROPHORESIS: ABNORMAL
PROTEIN ELECTROPHORESIS COMMENT: ABNORMAL

## 2024-11-01 ENCOUNTER — OFFICE VISIT (OUTPATIENT)
Dept: HEMATOLOGY/ONCOLOGY | Facility: CLINIC | Age: 69
End: 2024-11-01
Payer: MEDICARE

## 2024-11-01 VITALS
SYSTOLIC BLOOD PRESSURE: 148 MMHG | WEIGHT: 222.88 LBS | TEMPERATURE: 97.2 F | DIASTOLIC BLOOD PRESSURE: 75 MMHG | BODY MASS INDEX: 29.41 KG/M2 | HEART RATE: 70 BPM | RESPIRATION RATE: 17 BRPM | OXYGEN SATURATION: 95 %

## 2024-11-01 DIAGNOSIS — C90.01 MULTIPLE MYELOMA IN REMISSION (MULTI): ICD-10-CM

## 2024-11-01 PROCEDURE — 3078F DIAST BP <80 MM HG: CPT | Performed by: INTERNAL MEDICINE

## 2024-11-01 PROCEDURE — 99215 OFFICE O/P EST HI 40 MIN: CPT | Performed by: INTERNAL MEDICINE

## 2024-11-01 PROCEDURE — 3077F SYST BP >= 140 MM HG: CPT | Performed by: INTERNAL MEDICINE

## 2024-11-01 PROCEDURE — 1159F MED LIST DOCD IN RCRD: CPT | Performed by: INTERNAL MEDICINE

## 2024-11-01 PROCEDURE — 1126F AMNT PAIN NOTED NONE PRSNT: CPT | Performed by: INTERNAL MEDICINE

## 2024-11-01 PROCEDURE — G2211 COMPLEX E/M VISIT ADD ON: HCPCS | Performed by: INTERNAL MEDICINE

## 2024-11-01 SDOH — ECONOMIC STABILITY: FOOD INSECURITY: WITHIN THE PAST 12 MONTHS, THE FOOD YOU BOUGHT JUST DIDN'T LAST AND YOU DIDN'T HAVE MONEY TO GET MORE.: NEVER TRUE

## 2024-11-01 SDOH — ECONOMIC STABILITY: FOOD INSECURITY: WITHIN THE PAST 12 MONTHS, YOU WORRIED THAT YOUR FOOD WOULD RUN OUT BEFORE YOU GOT THE MONEY TO BUY MORE.: NEVER TRUE

## 2024-11-01 ASSESSMENT — PAIN SCALES - GENERAL: PAINLEVEL_OUTOF10: 0-NO PAIN

## 2024-11-01 ASSESSMENT — ENCOUNTER SYMPTOMS
RESPIRATORY NEGATIVE: 1
CARDIOVASCULAR NEGATIVE: 1
CONSTITUTIONAL NEGATIVE: 1
GASTROINTESTINAL NEGATIVE: 1

## 2024-11-07 ENCOUNTER — APPOINTMENT (OUTPATIENT)
Dept: HEMATOLOGY/ONCOLOGY | Facility: CLINIC | Age: 69
End: 2024-11-07
Payer: MEDICARE

## 2024-11-15 ENCOUNTER — TELEPHONE (OUTPATIENT)
Dept: HEMATOLOGY/ONCOLOGY | Facility: CLINIC | Age: 69
End: 2024-11-15
Payer: MEDICARE

## 2024-11-15 DIAGNOSIS — I10 HYPERTENSION, UNSPECIFIED TYPE: ICD-10-CM

## 2024-11-15 DIAGNOSIS — C90.00 MULTIPLE MYELOMA NOT HAVING ACHIEVED REMISSION (MULTI): ICD-10-CM

## 2024-11-15 RX ORDER — LENALIDOMIDE 5 MG/1
5 CAPSULE ORAL DAILY
Qty: 21 CAPSULE | Refills: 0 | Status: SHIPPED | OUTPATIENT
Start: 2024-11-15 | End: 2024-12-06

## 2024-11-15 NOTE — TELEPHONE ENCOUNTER
Medication: Revlimid AUTH #: [id number]  Dose: 5 mg  Directions:  Take 1 capsule (5 mg total) by mouth once daily for 21 days. Take 5mg PO D1-D21 then 7D off   Pharmacy: Print and I will fax to pharmacy  Last date filled: 10/21/24

## 2024-11-17 DIAGNOSIS — C90.00 MULTIPLE MYELOMA NOT HAVING ACHIEVED REMISSION (MULTI): ICD-10-CM

## 2024-11-18 RX ORDER — LENALIDOMIDE 5 MG/1
CAPSULE ORAL
Refills: 0 | OUTPATIENT
Start: 2024-11-18

## 2024-11-18 RX ORDER — HYDROCHLOROTHIAZIDE 25 MG/1
25 TABLET ORAL DAILY
Qty: 90 TABLET | Refills: 3 | Status: SHIPPED | OUTPATIENT
Start: 2024-11-18

## 2024-11-18 RX ORDER — LOSARTAN POTASSIUM 100 MG/1
100 TABLET ORAL DAILY
Qty: 90 TABLET | Refills: 0 | Status: SHIPPED | OUTPATIENT
Start: 2024-11-18

## 2024-12-06 ENCOUNTER — TELEPHONE (OUTPATIENT)
Dept: PRIMARY CARE | Facility: CLINIC | Age: 69
End: 2024-12-06
Payer: MEDICARE

## 2024-12-06 NOTE — TELEPHONE ENCOUNTER
Patient lived with two parents  that smoked.  With patients history would it be a good idea for him to have a screening lung CT?

## 2024-12-11 DIAGNOSIS — C90.00 MULTIPLE MYELOMA NOT HAVING ACHIEVED REMISSION (MULTI): ICD-10-CM

## 2024-12-12 DIAGNOSIS — C90.00 MULTIPLE MYELOMA NOT HAVING ACHIEVED REMISSION (MULTI): ICD-10-CM

## 2024-12-12 RX ORDER — LENALIDOMIDE 5 MG/1
5 CAPSULE ORAL DAILY
Qty: 21 CAPSULE | Refills: 0 | Status: SHIPPED | OUTPATIENT
Start: 2024-12-12 | End: 2025-01-02

## 2024-12-12 RX ORDER — LENALIDOMIDE 5 MG/1
CAPSULE ORAL
Refills: 0 | OUTPATIENT
Start: 2024-12-12

## 2024-12-12 NOTE — PROGRESS NOTES
Patient ID: Kartik Marquez is a 69 y.o. male.  Referring Physician: No referring provider defined for this encounter.  Primary Care Provider: Efrain Ricci DO  Visit Type:  {Visit Type:96769}     {Telehealth Consent:53062}    Subjective    HPI    Review of Systems - Oncology     Objective   BSA: There is no height or weight on file to calculate BSA.  There were no vitals taken for this visit.     has a past medical history of Hypertension, Malignant (primary) neoplasm, unspecified (Multi), Other conditions influencing health status, Pain in unspecified limb, Personal history of other diseases of the circulatory system, Personal history of other diseases of the musculoskeletal system and connective tissue, Personal history of other infectious and parasitic diseases, Radiculopathy, lumbosacral region, and Sprain of ligaments of lumbar spine, initial encounter.   has a past surgical history that includes Tonsillectomy (09/26/2013); Other surgical history (09/26/2013); Shoulder surgery (09/26/2013); and Vasectomy.  Family History   Problem Relation Name Age of Onset    Cancer Father Amarjit      Oncology History    No history exists.       Kartik Marquez  reports that he has never smoked. He has never used smokeless tobacco.  He  reports no history of alcohol use.  He  reports no history of drug use.    Physical Exam    WBC   Date/Time Value Ref Range Status   10/23/2024 09:56 AM 2.2 (L) 4.4 - 11.3 x10*3/uL Final   08/22/2024 09:46 AM 2.6 (L) 4.4 - 11.3 x10*3/uL Final   07/25/2024 08:05 AM 3.3 (L) 4.4 - 11.3 x10*3/uL Final     nRBC   Date Value Ref Range Status   08/22/2024 0.0 0.0 - 0.0 /100 WBCs Final   03/25/2024 0.0 0.0 - 0.0 /100 WBCs Final   11/21/2023 0.0 0.0 - 0.0 /100 WBCs Final     RBC   Date Value Ref Range Status   10/23/2024 4.45 (L) 4.50 - 5.90 x10*6/uL Final   08/22/2024 4.55 4.50 - 5.90 x10*6/uL Final   07/25/2024 4.52 4.50 - 5.90 x10*6/uL Final     Hemoglobin   Date Value Ref Range Status   10/23/2024 14.6  13.5 - 17.5 g/dL Final   08/22/2024 15.1 13.5 - 17.5 g/dL Final   07/25/2024 15.0 13.5 - 17.5 g/dL Final     Hematocrit   Date Value Ref Range Status   10/23/2024 43.9 41.0 - 52.0 % Final   08/22/2024 44.7 41.0 - 52.0 % Final   07/25/2024 44.9 41.0 - 52.0 % Final     MCV   Date/Time Value Ref Range Status   10/23/2024 09:56 AM 99 80 - 100 fL Final   08/22/2024 09:46 AM 98 80 - 100 fL Final   07/25/2024 08:05 AM 99 80 - 100 fL Final     MCH   Date/Time Value Ref Range Status   10/23/2024 09:56 AM 32.8 26.0 - 34.0 pg Final   08/22/2024 09:46 AM 33.2 26.0 - 34.0 pg Final   07/25/2024 08:05 AM 33.2 26.0 - 34.0 pg Final     MCHC   Date/Time Value Ref Range Status   10/23/2024 09:56 AM 33.3 32.0 - 36.0 g/dL Final   08/22/2024 09:46 AM 33.8 32.0 - 36.0 g/dL Final   07/25/2024 08:05 AM 33.4 32.0 - 36.0 g/dL Final     RDW   Date/Time Value Ref Range Status   10/23/2024 09:56 AM 13.0 11.5 - 14.5 % Final   08/22/2024 09:46 AM 13.3 11.5 - 14.5 % Final   07/25/2024 08:05 AM 13.3 11.5 - 14.5 % Final     Platelets   Date/Time Value Ref Range Status   10/23/2024 09:56  (L) 150 - 450 x10*3/uL Final   08/22/2024 09:46  (L) 150 - 450 x10*3/uL Final   07/25/2024 08:05  (L) 150 - 450 x10*3/uL Final     MPV   Date/Time Value Ref Range Status   07/31/2019 10:08 AM 10.6 7.0 - 12.6 CU Final   05/14/2019 03:51 PM 11.0 7.0 - 12.6 CU Final   02/19/2019 10:07 AM 10.5 7.0 - 12.6 CU Final     Neutrophils %   Date/Time Value Ref Range Status   10/23/2024 09:56 AM 51.1 40.0 - 80.0 % Final   08/22/2024 09:46 AM 49.4 40.0 - 80.0 % Final   07/25/2024 08:05 AM 57.4 40.0 - 80.0 % Final     Immature Granulocytes %, Automated   Date/Time Value Ref Range Status   10/23/2024 09:56 AM 0.0 0.0 - 0.9 % Final     Comment:     Immature Granulocyte Count (IG) includes promyelocytes, myelocytes and metamyelocytes but does not include bands. Percent differential counts (%) should be interpreted in the context of the absolute cell counts  (cells/UL).   08/22/2024 09:46 AM 0.0 0.0 - 0.9 % Final     Comment:     Immature Granulocyte Count (IG) includes promyelocytes, myelocytes and metamyelocytes but does not include bands. Percent differential counts (%) should be interpreted in the context of the absolute cell counts (cells/UL).   07/25/2024 08:05 AM 0.0 0.0 - 0.9 % Final     Comment:     Immature Granulocyte Count (IG) includes promyelocytes, myelocytes and metamyelocytes but does not include bands. Percent differential counts (%) should be interpreted in the context of the absolute cell counts (cells/UL).     Lymphocytes %   Date/Time Value Ref Range Status   10/23/2024 09:56 AM 34.2 13.0 - 44.0 % Final   08/22/2024 09:46 AM 26.6 13.0 - 44.0 % Final   07/25/2024 08:05 AM 24.2 13.0 - 44.0 % Final     Monocytes %   Date/Time Value Ref Range Status   10/23/2024 09:56 AM 11.9 2.0 - 10.0 % Final   08/22/2024 09:46 AM 16.0 2.0 - 10.0 % Final   07/25/2024 08:05 AM 14.2 2.0 - 10.0 % Final     Eosinophils %   Date/Time Value Ref Range Status   10/23/2024 09:56 AM 1.4 0.0 - 6.0 % Final   08/22/2024 09:46 AM 5.3 0.0 - 6.0 % Final   07/25/2024 08:05 AM 2.4 0.0 - 6.0 % Final     Basophils %   Date/Time Value Ref Range Status   10/23/2024 09:56 AM 1.4 0.0 - 2.0 % Final   08/22/2024 09:46 AM 2.7 0.0 - 2.0 % Final   07/25/2024 08:05 AM 1.8 0.0 - 2.0 % Final     Neutrophils Absolute   Date/Time Value Ref Range Status   10/23/2024 09:56 AM 1.12 (L) 1.20 - 7.70 x10*3/uL Final     Comment:     Percent differential counts (%) should be interpreted in the context of the absolute cell counts (cells/uL).   08/22/2024 09:46 AM 1.30 1.20 - 7.70 x10*3/uL Final     Comment:     Percent differential counts (%) should be interpreted in the context of the absolute cell counts (cells/uL).   07/25/2024 08:05 AM 1.89 1.20 - 7.70 x10*3/uL Final     Comment:     Percent differential counts (%) should be interpreted in the context of the absolute cell counts (cells/uL).     Immature  "Granulocytes Absolute, Automated   Date/Time Value Ref Range Status   10/23/2024 09:56 AM 0.00 0.00 - 0.70 x10*3/uL Final   08/22/2024 09:46 AM 0.00 0.00 - 0.70 x10*3/uL Final   07/25/2024 08:05 AM 0.00 0.00 - 0.70 x10*3/uL Final     Lymphocytes Absolute   Date/Time Value Ref Range Status   10/23/2024 09:56 AM 0.75 (L) 1.20 - 4.80 x10*3/uL Final   08/22/2024 09:46 AM 0.70 (L) 1.20 - 4.80 x10*3/uL Final   07/25/2024 08:05 AM 0.80 (L) 1.20 - 4.80 x10*3/uL Final     Monocytes Absolute   Date/Time Value Ref Range Status   10/23/2024 09:56 AM 0.26 0.10 - 1.00 x10*3/uL Final   08/22/2024 09:46 AM 0.42 0.10 - 1.00 x10*3/uL Final   07/25/2024 08:05 AM 0.47 0.10 - 1.00 x10*3/uL Final     Eosinophils Absolute   Date/Time Value Ref Range Status   10/23/2024 09:56 AM 0.03 0.00 - 0.70 x10*3/uL Final   08/22/2024 09:46 AM 0.14 0.00 - 0.70 x10*3/uL Final   07/25/2024 08:05 AM 0.08 0.00 - 0.70 x10*3/uL Final     Basophils Absolute   Date/Time Value Ref Range Status   10/23/2024 09:56 AM 0.03 0.00 - 0.10 x10*3/uL Final   08/22/2024 09:46 AM 0.07 0.00 - 0.10 x10*3/uL Final   07/25/2024 08:05 AM 0.06 0.00 - 0.10 x10*3/uL Final       No components found for: \"PT\"  No results found for: \"APTT\"    Assessment/Plan           {Assess/PlanSmartLinks:58812}         Symone Gomez MD                         "

## 2024-12-19 ENCOUNTER — TELEPHONE (OUTPATIENT)
Dept: HEMATOLOGY/ONCOLOGY | Facility: CLINIC | Age: 69
End: 2024-12-19
Payer: MEDICARE

## 2024-12-19 NOTE — TELEPHONE ENCOUNTER
[facility] also called stating they needed additional information to fill the rx, strength & quantity, Reference# [id number]  Call back # [phone].

## 2025-01-09 DIAGNOSIS — C90.00 MULTIPLE MYELOMA NOT HAVING ACHIEVED REMISSION (MULTI): ICD-10-CM

## 2025-01-09 RX ORDER — LENALIDOMIDE 5 MG/1
5 CAPSULE ORAL DAILY
Qty: 21 CAPSULE | Refills: 0 | Status: SHIPPED | OUTPATIENT
Start: 2025-01-09 | End: 2025-01-30

## 2025-01-12 DIAGNOSIS — E03.9 HYPOTHYROIDISM, UNSPECIFIED TYPE: ICD-10-CM

## 2025-01-13 RX ORDER — LEVOTHYROXINE SODIUM 25 UG/1
25 TABLET ORAL DAILY
Qty: 90 TABLET | Refills: 1 | Status: SHIPPED | OUTPATIENT
Start: 2025-01-13

## 2025-01-15 ENCOUNTER — TELEPHONE (OUTPATIENT)
Dept: HEMATOLOGY/ONCOLOGY | Facility: CLINIC | Age: 70
End: 2025-01-15
Payer: MEDICARE

## 2025-01-16 DIAGNOSIS — N40.1 BENIGN PROSTATIC HYPERPLASIA WITH LOWER URINARY TRACT SYMPTOMS, SYMPTOM DETAILS UNSPECIFIED: Primary | ICD-10-CM

## 2025-01-16 NOTE — TELEPHONE ENCOUNTER
Pt called in stating that he will be out of his finestaride before his appointment on 2/28/25 and just needs enough medication to last from today, 1/16/25, to then. Pt stated understanding to needing to be seen.

## 2025-01-17 ENCOUNTER — TELEPHONE (OUTPATIENT)
Dept: HEMATOLOGY/ONCOLOGY | Facility: CLINIC | Age: 70
End: 2025-01-17
Payer: MEDICARE

## 2025-01-17 DIAGNOSIS — C90.00 MULTIPLE MYELOMA NOT HAVING ACHIEVED REMISSION (MULTI): Primary | ICD-10-CM

## 2025-01-17 DIAGNOSIS — C90.00 MULTIPLE MYELOMA NOT HAVING ACHIEVED REMISSION (MULTI): ICD-10-CM

## 2025-01-17 RX ORDER — LENALIDOMIDE 5 MG/1
5 CAPSULE ORAL DAILY
Qty: 21 CAPSULE | Refills: 0 | Status: SHIPPED | OUTPATIENT
Start: 2025-01-17 | End: 2025-02-07

## 2025-01-17 RX ORDER — FINASTERIDE 5 MG/1
5 TABLET, FILM COATED ORAL DAILY
Qty: 45 TABLET | Refills: 0 | Status: SHIPPED | OUTPATIENT
Start: 2025-01-17

## 2025-01-17 NOTE — TELEPHONE ENCOUNTER
Spoke with patient and confirmed that I spoke with [facility] pharmacist and they see the script now in their system and that everything is correct and they are ready to fill script. Patient agreed to plan and verbalized understanding using teach back method.

## 2025-01-17 NOTE — TELEPHONE ENCOUNTER
Spoke with patient and let him know that we doing our best to try to get things resolved with [facility] to get his script filled.  Patient to reach out to his insurance to see if he can utalize a different specialty pharmacy.

## 2025-01-20 ENCOUNTER — TELEPHONE (OUTPATIENT)
Dept: HEMATOLOGY/ONCOLOGY | Facility: CLINIC | Age: 70
End: 2025-01-20
Payer: MEDICARE

## 2025-01-20 NOTE — TELEPHONE ENCOUNTER
Attempted to fax script over 8 times today, all fax failure. Emailed script x3 to different provided emails by [facility]. Called [facility] to confirm receipt of emails. Will not accept emailed script d/t nature of med, stated it must be faxed per lenolidomide specialist. Attempted to fax again. Spoke with lenolidomide specialist who stated they are aware of the current faxing issues, issue should be resolved shortly and please continue to try to fax and attempt again in the am.   
Called and spoke to [facility] pharmacy, provided auth # [id number] and confirmed that they did receive the prescription that was sent on 01/09/25. Pt made aware of this via [employer] message.   
Let patient know that [facility] is working on getting paper Rx faxed over - this is urgent as patient needs medication delivered tomorrow or Saturday to start medication on Sunday.   
Script received at 351pm 1/17/25 by [facility], shipping number by [employer] confirmed by [facility] rep this morning. Delivery should have been yesterday 1/19/25. Pt has been flagged by [facility] to be monitored d/t consistent problems with script being received electronically and via fax to prevent future delays. [facility] fax delays caused by 3rd party outage.   
Spoke with [facility], they are doing an expedited request and a paper script will be faxed to our office today. As soon as the fax is received, I will ask Dr Gomez to sign it and will immediately fax it back to [facility]. Per [facility], once received, they will expedite delivery.   
11-May-2021

## 2025-01-20 NOTE — TELEPHONE ENCOUNTER
Called and spoke to patient, he did say that he did speak with [facility] and they did set up delivery but it won't be delivered until this coming Wednesday. He also stated that he prefers to start his next cycle on Sunday even though he is going to receive the medication on Wednesday. Dr. Gomez made aware of this as well.

## 2025-01-22 ENCOUNTER — TELEPHONE (OUTPATIENT)
Dept: HEMATOLOGY/ONCOLOGY | Facility: CLINIC | Age: 70
End: 2025-01-22
Payer: MEDICARE

## 2025-01-27 ENCOUNTER — TELEPHONE (OUTPATIENT)
Dept: HEMATOLOGY/ONCOLOGY | Facility: CLINIC | Age: 70
End: 2025-01-27
Payer: MEDICARE

## 2025-01-27 NOTE — TELEPHONE ENCOUNTER
Script not yet due for refill, updated preferred pharmacy to [facility].

## 2025-01-28 ENCOUNTER — APPOINTMENT (OUTPATIENT)
Dept: PRIMARY CARE | Facility: CLINIC | Age: 70
End: 2025-01-28
Payer: MEDICARE

## 2025-01-30 ENCOUNTER — TELEPHONE (OUTPATIENT)
Dept: HEMATOLOGY/ONCOLOGY | Facility: CLINIC | Age: 70
End: 2025-01-30
Payer: MEDICARE

## 2025-01-31 ENCOUNTER — OFFICE VISIT (OUTPATIENT)
Dept: PRIMARY CARE | Facility: CLINIC | Age: 70
End: 2025-01-31
Payer: MEDICARE

## 2025-01-31 ENCOUNTER — HOSPITAL ENCOUNTER (OUTPATIENT)
Dept: RADIOLOGY | Facility: HOSPITAL | Age: 70
Discharge: HOME | End: 2025-01-31
Payer: MEDICARE

## 2025-01-31 VITALS
HEART RATE: 104 BPM | SYSTOLIC BLOOD PRESSURE: 118 MMHG | BODY MASS INDEX: 29.03 KG/M2 | DIASTOLIC BLOOD PRESSURE: 80 MMHG | WEIGHT: 220 LBS | TEMPERATURE: 96.5 F | OXYGEN SATURATION: 97 %

## 2025-01-31 DIAGNOSIS — D84.9 IMMUNOSUPPRESSION: ICD-10-CM

## 2025-01-31 DIAGNOSIS — R09.89 CHEST CONGESTION: ICD-10-CM

## 2025-01-31 DIAGNOSIS — R50.9 FEVER AND CHILLS: ICD-10-CM

## 2025-01-31 DIAGNOSIS — J06.9 ACUTE UPPER RESPIRATORY INFECTION, UNSPECIFIED: Primary | ICD-10-CM

## 2025-01-31 PROCEDURE — 1036F TOBACCO NON-USER: CPT | Performed by: FAMILY MEDICINE

## 2025-01-31 PROCEDURE — 3079F DIAST BP 80-89 MM HG: CPT | Performed by: FAMILY MEDICINE

## 2025-01-31 PROCEDURE — 99214 OFFICE O/P EST MOD 30 MIN: CPT | Performed by: FAMILY MEDICINE

## 2025-01-31 PROCEDURE — 1159F MED LIST DOCD IN RCRD: CPT | Performed by: FAMILY MEDICINE

## 2025-01-31 PROCEDURE — 3074F SYST BP LT 130 MM HG: CPT | Performed by: FAMILY MEDICINE

## 2025-01-31 PROCEDURE — 71046 X-RAY EXAM CHEST 2 VIEWS: CPT

## 2025-01-31 RX ORDER — AMOXICILLIN AND CLAVULANATE POTASSIUM 875; 125 MG/1; MG/1
875 TABLET, FILM COATED ORAL 2 TIMES DAILY
Qty: 14 TABLET | Refills: 0 | Status: SHIPPED | OUTPATIENT
Start: 2025-01-31 | End: 2025-02-07

## 2025-01-31 RX ORDER — BENZONATATE 100 MG/1
100 CAPSULE ORAL 3 TIMES DAILY PRN
Qty: 42 CAPSULE | Refills: 0 | Status: SHIPPED | OUTPATIENT
Start: 2025-01-31 | End: 2025-03-02

## 2025-01-31 ASSESSMENT — ENCOUNTER SYMPTOMS
FEVER: 1
SORE THROAT: 1
COUGH: 1

## 2025-01-31 NOTE — PROGRESS NOTES
Subjective   Patient ID: Kartik Marquez is a 69 y.o. male who presents for Cough (Cough, congestion fevers up and down x 5 days ).  Today he is accompanied by alone.     Fever   This is a new problem. The current episode started yesterday. The problem occurs constantly. The problem has been gradually worsening. The maximum temperature noted was 99 to 99.9 F. The temperature was taken using an oral thermometer. Associated symptoms include congestion, coughing and a sore throat.   Risk factors: hx of cancer and immunosuppression    Risk factors: no contaminated food, no contaminated water, no occupational exposure, no recent sickness, no recent travel and no sick contacts      All since Monday.  Initially had congestion in throat/sinuses and then moved into the chest. Getting worse now. Has phlegm production.  Yesterday felt better but then developed fever last night and had a fever this morning. Had chills last night as well. Still feeling worse today too. Has had fatigue throughout the course.     Tested negative for covid two times.   No flu testing. No myalgias except for today.      Current Outpatient Medications on File Prior to Visit   Medication Sig Dispense Refill    amLODIPine (Norvasc) 10 mg tablet TAKE 1 TABLET BY MOUTH DAILY 90 tablet 1    aspirin 81 mg EC tablet Take 1 tablet (81 mg) by mouth once daily.      finasteride (Proscar) 5 mg tablet Take 1 tablet (5 mg) by mouth once daily. 45 tablet 0    gabapentin (Neurontin) 300 mg capsule Take 1 capsule (300 mg) by mouth once daily (M-F).      hydroCHLOROthiazide (HYDRODiuril) 25 mg tablet TAKE 1 TABLET BY MOUTH EVERY DAY 90 tablet 3    Klor-Con M20 20 mEq ER tablet TAKE 1 AND 1/2 TABLETS DAILY BY MOUTH 135 tablet 3    lenalidomide (Revlimid) 5 mg capsule Take 1 capsule (5 mg total) by mouth once daily for 21 days.  Take 5mg PO D1-D21 then 7D off:  AUTH #: [id number] 21 capsule 0    lenalidomide (Revlimid) 5 mg capsule Take 1 capsule (5 mg total) by mouth once  daily for 21 days.  Take 5mg PO D1-D21 Hold for 7 D then repeat cycle: Take whole with water. Do not break, chew, or open. 21 capsule 0    levothyroxine (Synthroid, Levoxyl) 25 mcg tablet TAKE 1 TABLET BY MOUTH EVERY DAY 90 tablet 1    losartan (Cozaar) 100 mg tablet TAKE 1 TABLET BY MOUTH EVERY DAY 90 tablet 0    multivit-min/ferrous fumarate (MULTI VITAMIN ORAL) Take by mouth.      omeprazole (PriLOSEC) 20 mg DR capsule TAKE 1 CAPSULE BY MOUTH ONCE DAILY IN THE MORNING. TAKE BEFORE MEALS. DO NOT CRUSH, CHEW, OR SPLIT. 90 capsule 2     No current facility-administered medications on file prior to visit.        No Known Allergies    Immunization History   Administered Date(s) Administered    COVID-19, mRNA, LNP-S, PF, 30 mcg/0.3 mL dose 03/12/2021, 04/02/2021, 08/19/2021    Pfizer COVID-19 vaccine, 12 years and older, (30mcg/0.3mL) (Comirnaty) 12/01/2023, 10/08/2024    Pfizer COVID-19 vaccine, bivalent, age 12 years and older (30 mcg/0.3 mL) 11/11/2022    Pfizer Gray Cap SARS-CoV-2 04/01/2022         Review of Systems   Constitutional:  Positive for fever.   HENT:  Positive for congestion and sore throat.    Respiratory:  Positive for cough.      All pertinent positive symptoms are included in the history of present illness.  All other systems have been reviewed and are negative and noncontributory to this patient's current ailments.     Objective   /80   Pulse 104   Temp 35.8 °C (96.5 °F)   Wt 99.8 kg (220 lb)   SpO2 97%   BMI 29.03 kg/m²   BSA: 2.27 meters squared  No visits with results within 1 Month(s) from this visit.   Latest known visit with results is:   Lab on 10/23/2024   Component Date Value Ref Range Status    WBC 10/23/2024 2.2 (L)  4.4 - 11.3 x10*3/uL Final    RBC 10/23/2024 4.45 (L)  4.50 - 5.90 x10*6/uL Final    Hemoglobin 10/23/2024 14.6  13.5 - 17.5 g/dL Final    Hematocrit 10/23/2024 43.9  41.0 - 52.0 % Final    MCV 10/23/2024 99  80 - 100 fL Final    MCH 10/23/2024 32.8  26.0 - 34.0  pg Final    MCHC 10/23/2024 33.3  32.0 - 36.0 g/dL Final    RDW 10/23/2024 13.0  11.5 - 14.5 % Final    Platelets 10/23/2024 107 (L)  150 - 450 x10*3/uL Final    Neutrophils % 10/23/2024 51.1  40.0 - 80.0 % Final    Immature Granulocytes %, Automated 10/23/2024 0.0  0.0 - 0.9 % Final    Immature Granulocyte Count (IG) includes promyelocytes, myelocytes and metamyelocytes but does not include bands. Percent differential counts (%) should be interpreted in the context of the absolute cell counts (cells/UL).    Lymphocytes % 10/23/2024 34.2  13.0 - 44.0 % Final    Monocytes % 10/23/2024 11.9  2.0 - 10.0 % Final    Eosinophils % 10/23/2024 1.4  0.0 - 6.0 % Final    Basophils % 10/23/2024 1.4  0.0 - 2.0 % Final    Neutrophils Absolute 10/23/2024 1.12 (L)  1.20 - 7.70 x10*3/uL Final    Percent differential counts (%) should be interpreted in the context of the absolute cell counts (cells/uL).    Immature Granulocytes Absolute, Au* 10/23/2024 0.00  0.00 - 0.70 x10*3/uL Final    Lymphocytes Absolute 10/23/2024 0.75 (L)  1.20 - 4.80 x10*3/uL Final    Monocytes Absolute 10/23/2024 0.26  0.10 - 1.00 x10*3/uL Final    Eosinophils Absolute 10/23/2024 0.03  0.00 - 0.70 x10*3/uL Final    Basophils Absolute 10/23/2024 0.03  0.00 - 0.10 x10*3/uL Final    Glucose 10/23/2024 109 (H)  74 - 99 mg/dL Final    Sodium 10/23/2024 138  136 - 145 mmol/L Final    Potassium 10/23/2024 3.5  3.5 - 5.3 mmol/L Final    Chloride 10/23/2024 105  98 - 107 mmol/L Final    Bicarbonate 10/23/2024 26  21 - 32 mmol/L Final    Anion Gap 10/23/2024 11  10 - 20 mmol/L Final    Urea Nitrogen 10/23/2024 14  6 - 23 mg/dL Final    Creatinine 10/23/2024 0.87  0.50 - 1.30 mg/dL Final    eGFR 10/23/2024 >90  >60 mL/min/1.73m*2 Final    Calculations of estimated GFR are performed using the 2021 CKD-EPI Study Refit equation without the race variable for the IDMS-Traceable creatinine methods.  https://jasn.asnjournals.org/content/early/2021/09/22/ASN.0885081895     Calcium 10/23/2024 8.4 (L)  8.6 - 10.3 mg/dL Final    Albumin 10/23/2024 3.5  3.4 - 5.0 g/dL Final    Alkaline Phosphatase 10/23/2024 53  33 - 136 U/L Final    Total Protein 10/23/2024 6.7  6.4 - 8.2 g/dL Final    AST 10/23/2024 23  9 - 39 U/L Final    Bilirubin, Total 10/23/2024 1.0  0.0 - 1.2 mg/dL Final    ALT 10/23/2024 21  10 - 52 U/L Final    Patients treated with Sulfasalazine may generate falsely decreased results for ALT.    Ig Peterman Free Light Chain 10/23/2024 1.44  0.33 - 1.94 mg/dL Final    Ig Lambda Free Light Chain 10/23/2024 1.20  0.57 - 2.63 mg/dL Final    Kappa/Lambda Ratio 10/23/2024 1.20  0.26 - 1.65 Final    IgG 10/23/2024 1,830 (H)  700 - 1,600 mg/dL Final    IgA 10/23/2024 58 (L)  70 - 400 mg/dL Final    IgM 10/23/2024 41  40 - 230 mg/dL Final    Total Protein 10/23/2024 6.9  6.4 - 8.2 g/dL Final    Albumin 10/23/2024 3.9  3.4 - 5.0 g/dL Final    Alpha 1 Globulin 10/23/2024 0.2  0.2 - 0.6 g/dL Final    Alpha 2 Globulin 10/23/2024 0.5  0.4 - 1.1 g/dL Final    Beta Globulin 10/23/2024 0.6  0.5 - 1.2 g/dL Final    Gamma 10/23/2024 1.6 (H)  0.5 - 1.4 g/dL Final    M-PROTEIN 1 10/23/2024 1.0 (H)    g/dL Final    Protein Electrophoresis Comment 10/23/2024 10/23/24 Aberrant band detected in the gamma region, consistent with the patient's known monoclonal IgG lambda at 1.0 g/dL. Unchanged from the previous analysis on 7/25/24.   Final    Path Review - Serum Protein Electr* 10/23/2024 Reviewed and approved by ACOSTA MACDONALD on 10/24/24 at 5:30 PM.       Final       Physical Exam  CONSTITUTIONAL - well nourished, well developed, looks like stated age, in no acute distress, not ill-appearing, and not tired appearing  SKIN - normal skin color and pigmentation, normal skin turgor without rash, lesions, or nodules visualized  HEAD - no trauma, normocephalic  EYES - extraocular muscles are intact, and normal external exam  ENT - no signs of infection, uvula midline, normal tongue movement and throat  normal, no exudate  CHEST - clear to auscultation, no wheezing, no crackles and no rales, good effort  CARDIAC - regular rate and regular rhythm, no skipped beats, no murmur  EXTREMITIES - no edema, no deformities  NEUROLOGICAL - normal gait, normal balance, normal motor, no ataxia; alert, oriented and no focal signs  PSYCHIATRIC - alert, pleasant and cordial, age-appropriate      Assessment/Plan   Diagnoses and all orders for this visit:  Acute upper respiratory infection, unspecified  -     Sars-CoV-2 and Influenza A/B PCR; Future  Chest congestion  -     XR chest 2 views; Future  -     amoxicillin-pot clavulanate (Augmentin) 875-125 mg tablet; Take 1 tablet (875 mg) by mouth 2 times a day for 7 days.  -     Sars-CoV-2 and Influenza A/B PCR; Future  -     benzonatate (Tessalon) 100 mg capsule; Take 1 capsule (100 mg) by mouth 3 times a day as needed for cough. Do not crush or chew.  Immunosuppression  Fever and chills  -     amoxicillin-pot clavulanate (Augmentin) 875-125 mg tablet; Take 1 tablet (875 mg) by mouth 2 times a day for 7 days.    Covid and flu negative.    Low threshold for pneumonia in the setting of immunosuppression and time history of disease.  Will get chest x ray for further workup.  Sending in antibiotic for treatment.   Tessalon for cough suppression.    Please reach out to office if symptoms do not improve after antibiotic course.    Discussed the plan of care with the patient and they provided their understanding. All questions dicussed and answered during visit.

## 2025-02-01 NOTE — PROGRESS NOTES
I reviewed and examined the patient. I was present for the key exam elements, and I fully participated in the patient's care. I discussed the management of the care with the resident. I have personally reviewed the pertinent labs and imaging, as well as recent notes, with the patient. I have reviewed the note above and agree with the resident's medical decision making as documented in the resident's note, in addition to the following comments / findings:     Agree with the rest of the plan outlined below by resident physician. No red flags.      The patient understands and agrees to the assessment and plan of care. Patient has also agreed to follow up and comply with the treatment and evaluation as recommended today. Patient was instructed to call the office at [phone] should questions arise regarding their treatment or care.     Efrain Ricci DO, FAOASM  Family Medicine   [address], [address]  [place] [address]     Efrain Ricci DO

## 2025-02-03 ENCOUNTER — TELEPHONE (OUTPATIENT)
Dept: PRIMARY CARE | Facility: CLINIC | Age: 70
End: 2025-02-03
Payer: MEDICARE

## 2025-02-03 NOTE — TELEPHONE ENCOUNTER
Patient had second pneumonia vaccine 10/29/20.  Does he need to do another one due to medical history?  First one was 10/19

## 2025-02-05 DIAGNOSIS — J18.9 PNEUMONIA DUE TO INFECTIOUS ORGANISM, UNSPECIFIED LATERALITY, UNSPECIFIED PART OF LUNG: Primary | ICD-10-CM

## 2025-02-06 ENCOUNTER — TELEPHONE (OUTPATIENT)
Dept: PRIMARY CARE | Facility: CLINIC | Age: 70
End: 2025-02-06
Payer: MEDICARE

## 2025-02-06 DIAGNOSIS — K64.9 HEMORRHOIDS, UNSPECIFIED HEMORRHOID TYPE: Primary | ICD-10-CM

## 2025-02-06 RX ORDER — HYDROCORTISONE 25 MG/G
CREAM TOPICAL 4 TIMES DAILY PRN
Qty: 30 G | Refills: 0 | Status: SHIPPED | OUTPATIENT
Start: 2025-02-06 | End: 2026-02-06

## 2025-02-06 NOTE — TELEPHONE ENCOUNTER
Patient has really bad diarrhea they think from the antibiotic.  Has one day left.  He has hemmorhoids already.  He is also on Revlamid which causes that too.  They deal with that but its worse now.  What to do for diarrhea and hemmorhoids.  Use Otc Preparation H and some OTC things and for diarrhea Immodium.

## 2025-02-11 DIAGNOSIS — C90.00 MULTIPLE MYELOMA NOT HAVING ACHIEVED REMISSION (MULTI): ICD-10-CM

## 2025-02-11 RX ORDER — LENALIDOMIDE 5 MG/1
CAPSULE ORAL
OUTPATIENT
Start: 2025-02-11

## 2025-02-11 RX ORDER — LENALIDOMIDE 5 MG/1
5 CAPSULE ORAL DAILY
Qty: 21 CAPSULE | Refills: 0 | Status: SHIPPED | OUTPATIENT
Start: 2025-02-11 | End: 2025-03-04

## 2025-02-12 ENCOUNTER — LAB (OUTPATIENT)
Dept: LAB | Facility: HOSPITAL | Age: 70
End: 2025-02-12
Payer: MEDICARE

## 2025-02-12 DIAGNOSIS — C90.01 MULTIPLE MYELOMA IN REMISSION (MULTI): ICD-10-CM

## 2025-02-12 LAB
ALBUMIN SERPL BCP-MCNC: 3.5 G/DL (ref 3.4–5)
ALP SERPL-CCNC: 70 U/L (ref 33–136)
ALT SERPL W P-5'-P-CCNC: 30 U/L (ref 10–52)
ANION GAP SERPL CALC-SCNC: 10 MMOL/L (ref 10–20)
AST SERPL W P-5'-P-CCNC: 19 U/L (ref 9–39)
BASOPHILS # BLD AUTO: 0.05 X10*3/UL (ref 0–0.1)
BASOPHILS NFR BLD AUTO: 1 %
BILIRUB SERPL-MCNC: 0.7 MG/DL (ref 0–1.2)
BUN SERPL-MCNC: 15 MG/DL (ref 6–23)
CALCIUM SERPL-MCNC: 8.8 MG/DL (ref 8.6–10.3)
CHLORIDE SERPL-SCNC: 104 MMOL/L (ref 98–107)
CO2 SERPL-SCNC: 28 MMOL/L (ref 21–32)
CREAT SERPL-MCNC: 0.94 MG/DL (ref 0.5–1.3)
EGFRCR SERPLBLD CKD-EPI 2021: 88 ML/MIN/1.73M*2
EOSINOPHIL # BLD AUTO: 0.09 X10*3/UL (ref 0–0.7)
EOSINOPHIL NFR BLD AUTO: 1.8 %
ERYTHROCYTE [DISTWIDTH] IN BLOOD BY AUTOMATED COUNT: 12.6 % (ref 11.5–14.5)
GLUCOSE SERPL-MCNC: 97 MG/DL (ref 74–99)
HCT VFR BLD AUTO: 42.8 % (ref 41–52)
HGB BLD-MCNC: 14.3 G/DL (ref 13.5–17.5)
IGA SERPL-MCNC: 94 MG/DL (ref 70–400)
IGG SERPL-MCNC: 2310 MG/DL (ref 700–1600)
IGM SERPL-MCNC: 51 MG/DL (ref 40–230)
IMM GRANULOCYTES # BLD AUTO: 0.01 X10*3/UL (ref 0–0.7)
IMM GRANULOCYTES NFR BLD AUTO: 0.2 % (ref 0–0.9)
LYMPHOCYTES # BLD AUTO: 0.82 X10*3/UL (ref 1.2–4.8)
LYMPHOCYTES NFR BLD AUTO: 16.8 %
MCH RBC QN AUTO: 32.7 PG (ref 26–34)
MCHC RBC AUTO-ENTMCNC: 33.4 G/DL (ref 32–36)
MCV RBC AUTO: 98 FL (ref 80–100)
MONOCYTES # BLD AUTO: 0.63 X10*3/UL (ref 0.1–1)
MONOCYTES NFR BLD AUTO: 12.9 %
NEUTROPHILS # BLD AUTO: 3.29 X10*3/UL (ref 1.2–7.7)
NEUTROPHILS NFR BLD AUTO: 67.3 %
PLATELET # BLD AUTO: 152 X10*3/UL (ref 150–450)
POTASSIUM SERPL-SCNC: 3.4 MMOL/L (ref 3.5–5.3)
PROT SERPL-MCNC: 7.3 G/DL (ref 6.4–8.2)
PROT SERPL-MCNC: 7.4 G/DL (ref 6.4–8.2)
RBC # BLD AUTO: 4.37 X10*6/UL (ref 4.5–5.9)
SODIUM SERPL-SCNC: 139 MMOL/L (ref 136–145)
WBC # BLD AUTO: 4.9 X10*3/UL (ref 4.4–11.3)

## 2025-02-12 PROCEDURE — 82784 ASSAY IGA/IGD/IGG/IGM EACH: CPT | Mod: GEALAB

## 2025-02-12 PROCEDURE — 83521 IG LIGHT CHAINS FREE EACH: CPT | Mod: GEALAB

## 2025-02-12 PROCEDURE — 36415 COLL VENOUS BLD VENIPUNCTURE: CPT

## 2025-02-12 PROCEDURE — 84155 ASSAY OF PROTEIN SERUM: CPT | Mod: GEALAB

## 2025-02-12 PROCEDURE — 85025 COMPLETE CBC W/AUTO DIFF WBC: CPT

## 2025-02-12 PROCEDURE — 84165 PROTEIN E-PHORESIS SERUM: CPT | Performed by: STUDENT IN AN ORGANIZED HEALTH CARE EDUCATION/TRAINING PROGRAM

## 2025-02-12 PROCEDURE — 80053 COMPREHEN METABOLIC PANEL: CPT

## 2025-02-12 PROCEDURE — 84165 PROTEIN E-PHORESIS SERUM: CPT | Mod: GEALAB

## 2025-02-13 LAB
ALBUMIN: 3.8 G/DL (ref 3.4–5)
ALPHA 1 GLOBULIN: 0.4 G/DL (ref 0.2–0.6)
ALPHA 2 GLOBULIN: 0.8 G/DL (ref 0.4–1.1)
BETA GLOBULIN: 0.7 G/DL (ref 0.5–1.2)
GAMMA GLOBULIN: 1.8 G/DL (ref 0.5–1.4)
KAPPA LC SERPL-MCNC: 2.1 MG/DL (ref 0.33–1.94)
KAPPA LC/LAMBDA SER: 1.4 {RATIO} (ref 0.26–1.65)
LAMBDA LC SERPL-MCNC: 1.5 MG/DL (ref 0.57–2.63)
M-PROTEIN 1: 1.1 G/DL
PATH REVIEW-SERUM PROTEIN ELECTROPHORESIS: ABNORMAL
PROTEIN ELECTROPHORESIS COMMENT: ABNORMAL

## 2025-02-28 ENCOUNTER — APPOINTMENT (OUTPATIENT)
Dept: UROLOGY | Facility: CLINIC | Age: 70
End: 2025-02-28
Payer: MEDICARE

## 2025-03-05 ENCOUNTER — HOSPITAL ENCOUNTER (OUTPATIENT)
Dept: RADIOLOGY | Facility: HOSPITAL | Age: 70
Discharge: HOME | End: 2025-03-05
Payer: MEDICARE

## 2025-03-05 DIAGNOSIS — J18.9 PNEUMONIA DUE TO INFECTIOUS ORGANISM, UNSPECIFIED LATERALITY, UNSPECIFIED PART OF LUNG: ICD-10-CM

## 2025-03-05 PROCEDURE — 71046 X-RAY EXAM CHEST 2 VIEWS: CPT

## 2025-03-06 DIAGNOSIS — C90.00 MULTIPLE MYELOMA NOT HAVING ACHIEVED REMISSION (MULTI): ICD-10-CM

## 2025-03-06 RX ORDER — LENALIDOMIDE 5 MG/1
CAPSULE ORAL
Refills: 0 | OUTPATIENT
Start: 2025-03-06

## 2025-03-06 RX ORDER — LENALIDOMIDE 5 MG/1
5 CAPSULE ORAL DAILY
Qty: 21 CAPSULE | Refills: 0 | Status: SHIPPED | OUTPATIENT
Start: 2025-03-06 | End: 2025-03-27

## 2025-03-10 DIAGNOSIS — C90.00 MULTIPLE MYELOMA NOT HAVING ACHIEVED REMISSION (MULTI): ICD-10-CM

## 2025-03-10 DIAGNOSIS — I10 HYPERTENSION, UNSPECIFIED TYPE: ICD-10-CM

## 2025-03-12 RX ORDER — LOSARTAN POTASSIUM 100 MG/1
100 TABLET ORAL DAILY
Qty: 90 TABLET | Refills: 1 | Status: SHIPPED | OUTPATIENT
Start: 2025-03-12

## 2025-03-14 DIAGNOSIS — C90.00 MULTIPLE MYELOMA NOT HAVING ACHIEVED REMISSION (MULTI): ICD-10-CM

## 2025-03-14 RX ORDER — OMEPRAZOLE 20 MG/1
20 CAPSULE, DELAYED RELEASE ORAL
Qty: 90 CAPSULE | Refills: 3 | Status: SHIPPED | OUTPATIENT
Start: 2025-03-14

## 2025-03-14 RX ORDER — OMEPRAZOLE 20 MG/1
CAPSULE, DELAYED RELEASE ORAL
Qty: 90 CAPSULE | Refills: 2 | OUTPATIENT
Start: 2025-03-14

## 2025-03-16 DIAGNOSIS — N40.1 BENIGN PROSTATIC HYPERPLASIA WITH LOWER URINARY TRACT SYMPTOMS, SYMPTOM DETAILS UNSPECIFIED: ICD-10-CM

## 2025-03-16 RX ORDER — FINASTERIDE 5 MG/1
5 TABLET, FILM COATED ORAL DAILY
Qty: 90 TABLET | Refills: 3 | Status: SHIPPED | OUTPATIENT
Start: 2025-03-16 | End: 2025-03-17 | Stop reason: SDUPTHER

## 2025-03-17 DIAGNOSIS — N40.1 BENIGN PROSTATIC HYPERPLASIA WITH LOWER URINARY TRACT SYMPTOMS, SYMPTOM DETAILS UNSPECIFIED: ICD-10-CM

## 2025-03-18 RX ORDER — FINASTERIDE 5 MG/1
5 TABLET, FILM COATED ORAL DAILY
Qty: 90 TABLET | Refills: 3 | Status: SHIPPED | OUTPATIENT
Start: 2025-03-18

## 2025-03-27 ENCOUNTER — APPOINTMENT (OUTPATIENT)
Dept: AUDIOLOGY | Facility: CLINIC | Age: 70
End: 2025-03-27
Payer: MEDICARE

## 2025-03-27 DIAGNOSIS — H93.13 TINNITUS OF BOTH EARS: Primary | ICD-10-CM

## 2025-03-27 DIAGNOSIS — R42 DIZZINESS: ICD-10-CM

## 2025-03-27 DIAGNOSIS — H90.3 ASYMMETRICAL SENSORINEURAL HEARING LOSS: ICD-10-CM

## 2025-03-27 PROCEDURE — 92557 COMPREHENSIVE HEARING TEST: CPT | Performed by: AUDIOLOGIST

## 2025-03-27 PROCEDURE — 92567 TYMPANOMETRY: CPT | Performed by: AUDIOLOGIST

## 2025-03-27 NOTE — PROGRESS NOTES
"  AUDIOLOGY ADULT AUDIOMETRIC EVALUATION    Name:  Kartik Marquez  :  1955  Age:  69 y.o.  Date of Evaluation:  2025    Reason for visit: Kartik is seen in the clinic today for an audiologic evaluation.   He is scheduled for a follow-up with otolaryngologist Rebel Connell MD 2025.      HISTORY  Patient reports both ears have a constant tinnitus \"sounds like peepers\" bilaterally.   When it's quiet, the tinnitus is significantly more noticeable.  Patient reports long history of noise exposure through work; engine sound by left ear.  He denies any fullness in ears.   Patient does report some positional dizziness; brief.      EVALUATION  See scanned audiogram: “Media” > “Audiology Report”.      RESULTS  Otoscopic Evaluation:  Right Ear: clear ear canal  Left Ear: clear ear canal    Immittance Measures:  Tympanometry:  screening; difficulty maintaining seal  Right Ear: Type Ad, hypercompliant tympanic membrane mobility with normal middle ear pressure  Left Ear: Type A, normal tympanic membrane mobility with normal middle ear pressure      Distortion Product Otoacoustic Emissions (DPOAEs):  Right Ear: Refer at most frequencies 1KHz-8KHz; passed 1500 Hz and 2000 Hz    Left Ear: Refer at most frequencies 1KHz-8KHz; passed 1500 Hz and 2000 Hz      Audiometry:  Test Technique and Reliability:   Standard audiometry via insert phones.  Reliability is good.    Pure tone air and bone conduction audiometry:  Right Ear: Mild to moderate sensorineural hearing loss 3000 Hz and above   Left Ear: Asymmetrical mild sensorineural hearing loss through 2000 Hz dropping to moderately severe to severe at 3000 Hz through 4000 Hz, rising to moderate at 6000 Hz and above    Speech Audiometry (Word Recognition Scores):   Right Ear: Excellent at most comfortable listening level of loudness of 65 dB HL  Left Ear: Excellent at most comfortable listening level of loudness of 70 dB HL    IMPRESSIONS    LEFT ear: Asymmetrical mild " sensorineural hearing loss through 2000 Hz dropping to moderately severe to severe at 3000 Hz and 4000 Hz; rising to moderate at 6000 Hz and above.    RIGHT ear: Mild to moderate sensorineural hearing loss 3000 Hz and above  Hallpike head right and left were both negative for BPPV today.      RECOMMENDATIONS  - Follow up with otolaryngology scheduled 4/2/25  - Audiologic evaluation in conjunction with otologic care, if an acute change is noted, and/or annually.  - Discussed audiogram; hearing aid evaluation     PATIENT EDUCATION  Discussed results, impressions and recommendations with the patient. Questions were addressed and the patient was encouraged to contact our office should concerns arise.    Time for this encounter: 1030/1122    Dipti Foley M.A., CCC/A   Licensed Audiologist

## 2025-03-30 DIAGNOSIS — I10 HYPERTENSION, UNSPECIFIED TYPE: ICD-10-CM

## 2025-03-31 DIAGNOSIS — I10 HYPERTENSION, UNSPECIFIED TYPE: ICD-10-CM

## 2025-03-31 RX ORDER — AMLODIPINE BESYLATE 10 MG/1
10 TABLET ORAL DAILY
Qty: 90 TABLET | Refills: 1 | OUTPATIENT
Start: 2025-03-31

## 2025-03-31 RX ORDER — AMLODIPINE BESYLATE 10 MG/1
10 TABLET ORAL DAILY
Qty: 90 TABLET | Refills: 1 | Status: SHIPPED | OUTPATIENT
Start: 2025-03-31

## 2025-04-02 ENCOUNTER — APPOINTMENT (OUTPATIENT)
Dept: OTOLARYNGOLOGY | Facility: CLINIC | Age: 70
End: 2025-04-02
Payer: MEDICARE

## 2025-04-02 VITALS — TEMPERATURE: 94.8 F | BODY MASS INDEX: 29.16 KG/M2 | WEIGHT: 220 LBS | HEIGHT: 73 IN

## 2025-04-02 DIAGNOSIS — G47.33 OBSTRUCTIVE SLEEP APNEA: Primary | ICD-10-CM

## 2025-04-02 DIAGNOSIS — H90.3 SENSORINEURAL HEARING LOSS (SNHL) OF BOTH EARS: ICD-10-CM

## 2025-04-02 DIAGNOSIS — H93.13 TINNITUS OF BOTH EARS: ICD-10-CM

## 2025-04-02 PROCEDURE — 1159F MED LIST DOCD IN RCRD: CPT | Performed by: OTOLARYNGOLOGY

## 2025-04-02 PROCEDURE — 99204 OFFICE O/P NEW MOD 45 MIN: CPT | Performed by: OTOLARYNGOLOGY

## 2025-04-02 PROCEDURE — 3008F BODY MASS INDEX DOCD: CPT | Performed by: OTOLARYNGOLOGY

## 2025-04-02 PROCEDURE — 1036F TOBACCO NON-USER: CPT | Performed by: OTOLARYNGOLOGY

## 2025-04-02 NOTE — PROGRESS NOTES
"Chief Complaint   Patient presents with    New Patient Visit     NP HEARING GroSocialMCKINLEY NOISE, SNORES      Date of Evaluation: 4/2/2025   Kartik was seen today for new patient visit.  Diagnoses and all orders for this visit:  Obstructive sleep apnea (Primary)  -     Home sleep apnea test (HSAT); Future  Sensorineural hearing loss (SNHL) of both ears  Tinnitus of both ears       PLAN  I had a long discussion regarding his tinnitus and this snoring.  First I have recommended he consider hearing aid to help with his hearing loss and his tinnitus.  We discussed the pathophysiology of tinnitus.  Secondly we discussed his snoring.  I am concerned that he does have obstructive sleep apnea.  I would like to check a sleep study and if in fact he has moderate to severe sleep apnea we will start with CPAP.      HPI  Kartik Marquez is a 69 y.o. male with bilateral constant monotonal tinnitus.  He has a history of noise exposure through work with the engine sound closest to the left ear.  He denies aural fullness.  His audiogram shows bilateral moderate to severe sensorineural hearing loss with good word recognition and normal tympanometry.  A second issue is 1 of snoring and possible sleep apnea.  He has a history of septoplasty and UPPP years ago.  This helped temporarily.  He had a return of snoring and he tried a dental appliance which helped temporarily and he has had a return of snoring.  He does not sleep well.  He wakes up very tired.  He has a history of multiple myeloma.    Physical Exam:  Last Recorded Vitals  Temperature 34.9 °C (94.8 °F), height 1.854 m (6' 1\"), weight 99.8 kg (220 lb). , Body mass index is 29.03 kg/m².  []General appearance: Well-developed, well-nourished in no acute distress, conversant with normal voice quality    Head/face: No erythema or edema or facial tenderness, and normal facial nerve function bilaterally    External ear: Clear external auditory canals with normal pinnae  Tube status: N/A  Middle " ear: Tympanic membranes intact and mobile, middle ears normal.  Tympanic membrane perforation: N/A  Mastoid bowl: N/A  Hearing: Normal conversational awareness at normal speech thresholds    Nose visualized using: Anterior rhinoscopy  Nasal dorsum: Nontraumatic midline appearance  Septum: Midline, nonobstructing  Inferior turbinates: Normal, pink  Secretions: Dry    Oral cavity and oropharynx: Normal  Teeth: Good condition  Floor of mouth: without lesions  Palate: Normal hard palate, soft palate and status post UPPP  Oropharynx: Clear, no lesions present  Buccal mucosa: Normal without masses or lesions  Lips: Normal    Nasopharynx: Inadequate mirror exam secondary to gag/anatomy    Neck:  Salivary glands: Normal bilateral parotid and submandibular glands by inspection and palpation.  Non-thyroid masses: No palpable masses or significant lymphadenopathy  Trachea: Midline  Thyroid: No thyromegaly or palpable nodules  Temporomandibular joint: Nontender  Cervical range of motion: Normal    Neurologic exam: Alert and oriented x3, appropriate affect.  Cranial nerves II-XII normal bilaterally  Extraocular movement: Extraocular movement intact, normal gaze alignment     Past Medical History:   Diagnosis Date    Hypertension     Malignant (primary) neoplasm, unspecified (Multi)     Cancer    Other conditions influencing health status     Overexertion From Lifting    Pain in unspecified limb     Limb pain    Personal history of other diseases of the circulatory system     History of hypertension    Personal history of other diseases of the musculoskeletal system and connective tissue     History of low back pain    Personal history of other infectious and parasitic diseases     History of herpes zoster    Radiculopathy, lumbosacral region     Lumbosacral neuritis    Sprain of ligaments of lumbar spine, initial encounter     Low back sprain      Past Surgical History:   Procedure Laterality Date    NASAL SEPTUM SURGERY       OTHER SURGICAL HISTORY  09/26/2013    Uvulectomy    SHOULDER SURGERY  09/26/2013    Shoulder Surgery    TONSILLECTOMY  09/26/2013    Tonsillectomy    VASECTOMY            Medications:   Current Outpatient Medications   Medication Instructions    amLODIPine (NORVASC) 10 mg, oral, Daily    aspirin 81 mg, Daily    finasteride (PROSCAR) 5 mg, oral, Daily    gabapentin (NEURONTIN) 300 mg, Once daily (morning) M-F (5 days a week)    hydroCHLOROthiazide (HYDRODIURIL) 25 mg, oral, Daily    hydrocortisone (Anusol-HC) 2.5 % rectal cream rectal, 4 times daily PRN, Apply to affected areas    Klor-Con M20 20 mEq ER tablet 30 mEq, oral, Daily    levothyroxine (SYNTHROID, LEVOXYL) 25 mcg, oral, Daily    losartan (COZAAR) 100 mg, oral, Daily    multivit-min/ferrous fumarate (MULTI VITAMIN ORAL) Take by mouth.    omeprazole (PRILOSEC) 20 mg, oral, Daily before breakfast, Do not crush or chew.        Allergies:  No Known Allergies       Rebel Connell MD

## 2025-04-04 DIAGNOSIS — C90.00 MULTIPLE MYELOMA NOT HAVING ACHIEVED REMISSION (MULTI): ICD-10-CM

## 2025-04-04 RX ORDER — LENALIDOMIDE 5 MG/1
CAPSULE ORAL
Refills: 0 | OUTPATIENT
Start: 2025-04-04

## 2025-04-09 ENCOUNTER — PROCEDURE VISIT (OUTPATIENT)
Dept: SLEEP MEDICINE | Facility: HOSPITAL | Age: 70
End: 2025-04-09
Payer: MEDICARE

## 2025-04-09 DIAGNOSIS — G47.33 OBSTRUCTIVE SLEEP APNEA: ICD-10-CM

## 2025-04-09 PROCEDURE — 95806 SLEEP STUDY UNATT&RESP EFFT: CPT | Performed by: STUDENT IN AN ORGANIZED HEALTH CARE EDUCATION/TRAINING PROGRAM

## 2025-04-23 ENCOUNTER — LAB (OUTPATIENT)
Dept: LAB | Facility: HOSPITAL | Age: 70
End: 2025-04-23
Payer: MEDICARE

## 2025-04-23 DIAGNOSIS — C90.01 MULTIPLE MYELOMA IN REMISSION (MULTI): ICD-10-CM

## 2025-04-23 LAB
ALBUMIN SERPL BCP-MCNC: 3.7 G/DL (ref 3.4–5)
ALP SERPL-CCNC: 60 U/L (ref 33–136)
ALT SERPL W P-5'-P-CCNC: 19 U/L (ref 10–52)
ANION GAP SERPL CALC-SCNC: 11 MMOL/L (ref 10–20)
AST SERPL W P-5'-P-CCNC: 21 U/L (ref 9–39)
BASOPHILS # BLD AUTO: 0.04 X10*3/UL (ref 0–0.1)
BASOPHILS NFR BLD AUTO: 0.9 %
BILIRUB SERPL-MCNC: 0.9 MG/DL (ref 0–1.2)
BUN SERPL-MCNC: 15 MG/DL (ref 6–23)
CALCIUM SERPL-MCNC: 8.7 MG/DL (ref 8.6–10.3)
CHLORIDE SERPL-SCNC: 104 MMOL/L (ref 98–107)
CO2 SERPL-SCNC: 25 MMOL/L (ref 21–32)
CREAT SERPL-MCNC: 0.85 MG/DL (ref 0.5–1.3)
EGFRCR SERPLBLD CKD-EPI 2021: >90 ML/MIN/1.73M*2
EOSINOPHIL # BLD AUTO: 0.04 X10*3/UL (ref 0–0.7)
EOSINOPHIL NFR BLD AUTO: 0.9 %
ERYTHROCYTE [DISTWIDTH] IN BLOOD BY AUTOMATED COUNT: 13.4 % (ref 11.5–14.5)
GLUCOSE SERPL-MCNC: 133 MG/DL (ref 74–99)
HCT VFR BLD AUTO: 42 % (ref 41–52)
HGB BLD-MCNC: 14.3 G/DL (ref 13.5–17.5)
IGA SERPL-MCNC: 78 MG/DL (ref 70–400)
IGG SERPL-MCNC: 1920 MG/DL (ref 700–1600)
IGM SERPL-MCNC: 37 MG/DL (ref 40–230)
IMM GRANULOCYTES # BLD AUTO: 0 X10*3/UL (ref 0–0.7)
IMM GRANULOCYTES NFR BLD AUTO: 0 % (ref 0–0.9)
LYMPHOCYTES # BLD AUTO: 0.57 X10*3/UL (ref 1.2–4.8)
LYMPHOCYTES NFR BLD AUTO: 13.4 %
MCH RBC QN AUTO: 33.3 PG (ref 26–34)
MCHC RBC AUTO-ENTMCNC: 34 G/DL (ref 32–36)
MCV RBC AUTO: 98 FL (ref 80–100)
MONOCYTES # BLD AUTO: 0.51 X10*3/UL (ref 0.1–1)
MONOCYTES NFR BLD AUTO: 12 %
NEUTROPHILS # BLD AUTO: 3.08 X10*3/UL (ref 1.2–7.7)
NEUTROPHILS NFR BLD AUTO: 72.8 %
PLATELET # BLD AUTO: 147 X10*3/UL (ref 150–450)
POTASSIUM SERPL-SCNC: 3.3 MMOL/L (ref 3.5–5.3)
PROT SERPL-MCNC: 7.3 G/DL (ref 6.4–8.2)
PROT SERPL-MCNC: 7.4 G/DL (ref 6.4–8.2)
RBC # BLD AUTO: 4.3 X10*6/UL (ref 4.5–5.9)
SODIUM SERPL-SCNC: 137 MMOL/L (ref 136–145)
WBC # BLD AUTO: 4.2 X10*3/UL (ref 4.4–11.3)

## 2025-04-23 PROCEDURE — 84165 PROTEIN E-PHORESIS SERUM: CPT | Performed by: PATHOLOGY

## 2025-04-23 PROCEDURE — 80053 COMPREHEN METABOLIC PANEL: CPT

## 2025-04-23 PROCEDURE — 85025 COMPLETE CBC W/AUTO DIFF WBC: CPT

## 2025-04-23 PROCEDURE — 82784 ASSAY IGA/IGD/IGG/IGM EACH: CPT | Mod: GEALAB

## 2025-04-23 PROCEDURE — 83521 IG LIGHT CHAINS FREE EACH: CPT | Mod: GEALAB

## 2025-04-23 PROCEDURE — 84155 ASSAY OF PROTEIN SERUM: CPT | Mod: GEALAB

## 2025-04-23 PROCEDURE — 36415 COLL VENOUS BLD VENIPUNCTURE: CPT

## 2025-04-23 PROCEDURE — 84165 PROTEIN E-PHORESIS SERUM: CPT | Mod: GEALAB

## 2025-04-24 LAB
KAPPA LC SERPL-MCNC: 1.56 MG/DL (ref 0.33–1.94)
KAPPA LC/LAMBDA SER: 1.46 {RATIO} (ref 0.26–1.65)
LAMBDA LC SERPL-MCNC: 1.07 MG/DL (ref 0.57–2.63)

## 2025-04-25 LAB
ALBUMIN: 4.1 G/DL (ref 3.4–5)
ALPHA 1 GLOBULIN: 0.3 G/DL (ref 0.2–0.6)
ALPHA 2 GLOBULIN: 0.6 G/DL (ref 0.4–1.1)
BETA GLOBULIN: 0.7 G/DL (ref 0.5–1.2)
GAMMA GLOBULIN: 1.8 G/DL (ref 0.5–1.4)
M-PROTEIN 1: 1.1 G/DL (ref ?–0)
PATH REVIEW-SERUM PROTEIN ELECTROPHORESIS: ABNORMAL
PROTEIN ELECTROPHORESIS COMMENT: ABNORMAL

## 2025-04-30 DIAGNOSIS — C90.00 MULTIPLE MYELOMA NOT HAVING ACHIEVED REMISSION (MULTI): ICD-10-CM

## 2025-05-01 ENCOUNTER — OFFICE VISIT (OUTPATIENT)
Dept: HEMATOLOGY/ONCOLOGY | Facility: CLINIC | Age: 70
End: 2025-05-01
Payer: MEDICARE

## 2025-05-01 VITALS
RESPIRATION RATE: 16 BRPM | TEMPERATURE: 97.9 F | HEART RATE: 63 BPM | OXYGEN SATURATION: 93 % | DIASTOLIC BLOOD PRESSURE: 73 MMHG | HEIGHT: 71 IN | WEIGHT: 219.36 LBS | BODY MASS INDEX: 30.71 KG/M2 | SYSTOLIC BLOOD PRESSURE: 132 MMHG

## 2025-05-01 DIAGNOSIS — C90.00 MULTIPLE MYELOMA NOT HAVING ACHIEVED REMISSION (MULTI): ICD-10-CM

## 2025-05-01 DIAGNOSIS — C90.01 MULTIPLE MYELOMA IN REMISSION (MULTI): ICD-10-CM

## 2025-05-01 PROCEDURE — 1126F AMNT PAIN NOTED NONE PRSNT: CPT | Performed by: INTERNAL MEDICINE

## 2025-05-01 PROCEDURE — 99215 OFFICE O/P EST HI 40 MIN: CPT | Performed by: INTERNAL MEDICINE

## 2025-05-01 PROCEDURE — 3075F SYST BP GE 130 - 139MM HG: CPT | Performed by: INTERNAL MEDICINE

## 2025-05-01 PROCEDURE — 1159F MED LIST DOCD IN RCRD: CPT | Performed by: INTERNAL MEDICINE

## 2025-05-01 PROCEDURE — 3008F BODY MASS INDEX DOCD: CPT | Performed by: INTERNAL MEDICINE

## 2025-05-01 PROCEDURE — 3078F DIAST BP <80 MM HG: CPT | Performed by: INTERNAL MEDICINE

## 2025-05-01 RX ORDER — LENALIDOMIDE 5 MG/1
5 CAPSULE ORAL DAILY
Qty: 21 CAPSULE | Refills: 0 | Status: SHIPPED | OUTPATIENT
Start: 2025-05-01 | End: 2025-05-22

## 2025-05-01 ASSESSMENT — ENCOUNTER SYMPTOMS
RESPIRATORY NEGATIVE: 1
CONSTITUTIONAL NEGATIVE: 1
CARDIOVASCULAR NEGATIVE: 1
GASTROINTESTINAL NEGATIVE: 1

## 2025-05-01 ASSESSMENT — PAIN SCALES - GENERAL: PAINLEVEL_OUTOF10: 0-NO PAIN

## 2025-05-01 NOTE — PROGRESS NOTES
"Patient ID: Kartik Marquez is a 69 y.o. male.  Referring Physician: Symone Gomez MD  [id number] [place] [address]  Primary Care Provider: Efrain Ricci DO  Visit Type:  Follow Up     Subjective    HPI  Patient with Smodering multiple myeloma, IgG lambda, diagnosed around 2011, felt to be a high risk of progression into multiple myeloma. started on revlimid and decadron,  off decadron since 12/9/14. M spike began to increase. responded well with resuming decadron since 3/3/15. dose of revlimid was increased to 15 mg given increased M protein since March 2015. tapered decadron to 8 mg weekly, then off in 9/2017 due to concerns  for infection. PNA in 6/2018 dose of revlimid was down to 10 mg daily 3 weeks on one week off. M protein has slowly increasing to 1.33 g/dL on 2/19/19 and 1.36 g/dL on 5/14/19, 1.7 g/dL on 11/6/19 and 1.3 g/dL on 1/8/2020. m protein down to 1.3 from 1.6  in 4/2020 and stable since then.     11/28/22: first virtual visit with me today. His Revlimid dosage was dropped from 10 mg, 3 weeks on one week off to 5 mg every day, 3 weeks on one week off. His markers has been stable. On assessment, he is doing well. Energy and appetite is stable. Peripheral  neuropathy stable/improved. He denies fever, chills, night sweats, anorexia, weight loss, CP, SOB, abd pain, n/v/d, bleeding, and any other complaints at this time.   Review of Systems   Constitutional: Negative.    HENT:  Negative.     Respiratory: Negative.     Cardiovascular: Negative.    Gastrointestinal: Negative.         Objective   BSA: 2.23 meters squared  /73 (BP Location: Left arm)   Pulse 63   Temp 36.6 °C (97.9 °F)   Resp 16   Ht (S) 1.801 m (5' 10.91\")   Wt 99.5 kg (219 lb 5.7 oz)   SpO2 93%   BMI 30.68 kg/m²      has a past medical history of Hypertension, Malignant (primary) neoplasm, unspecified (Multi), Other conditions influencing health status, Pain in unspecified limb, Personal history " of other diseases of the circulatory system, Personal history of other diseases of the musculoskeletal system and connective tissue, Personal history of other infectious and parasitic diseases, Radiculopathy, lumbosacral region, and Sprain of ligaments of lumbar spine, initial encounter.   has a past surgical history that includes Tonsillectomy (09/26/2013); Other surgical history (09/26/2013); Shoulder surgery (09/26/2013); Vasectomy; and Nasal septum surgery.  Family History[1]  Oncology History    No history exists.       Kartik Marquez  reports that he has never smoked. He has never used smokeless tobacco.  He  reports no history of alcohol use.  He  reports no history of drug use.    Physical Exam  Constitutional:       Appearance: Normal appearance.   HENT:      Head: Normocephalic and atraumatic.   Eyes:      Extraocular Movements: Extraocular movements intact.      Pupils: Pupils are equal, round, and reactive to light.   Neurological:      Mental Status: He is alert.         WBC   Date/Time Value Ref Range Status   04/23/2025 10:24 AM 4.2 (L) 4.4 - 11.3 x10*3/uL Final   02/12/2025 11:12 AM 4.9 4.4 - 11.3 x10*3/uL Final   10/23/2024 09:56 AM 2.2 (L) 4.4 - 11.3 x10*3/uL Final     nRBC   Date Value Ref Range Status   08/22/2024 0.0 0.0 - 0.0 /100 WBCs Final   03/25/2024 0.0 0.0 - 0.0 /100 WBCs Final   11/21/2023 0.0 0.0 - 0.0 /100 WBCs Final     RBC   Date Value Ref Range Status   04/23/2025 4.30 (L) 4.50 - 5.90 x10*6/uL Final   02/12/2025 4.37 (L) 4.50 - 5.90 x10*6/uL Final   10/23/2024 4.45 (L) 4.50 - 5.90 x10*6/uL Final     Hemoglobin   Date Value Ref Range Status   04/23/2025 14.3 13.5 - 17.5 g/dL Final   02/12/2025 14.3 13.5 - 17.5 g/dL Final   10/23/2024 14.6 13.5 - 17.5 g/dL Final     Hematocrit   Date Value Ref Range Status   04/23/2025 42.0 41.0 - 52.0 % Final   02/12/2025 42.8 41.0 - 52.0 % Final   10/23/2024 43.9 41.0 - 52.0 % Final     MCV   Date/Time Value Ref Range Status   04/23/2025 10:24 AM 98  80 - 100 fL Final   02/12/2025 11:12 AM 98 80 - 100 fL Final   10/23/2024 09:56 AM 99 80 - 100 fL Final     MCH   Date/Time Value Ref Range Status   04/23/2025 10:24 AM 33.3 26.0 - 34.0 pg Final   02/12/2025 11:12 AM 32.7 26.0 - 34.0 pg Final   10/23/2024 09:56 AM 32.8 26.0 - 34.0 pg Final     MCHC   Date/Time Value Ref Range Status   04/23/2025 10:24 AM 34.0 32.0 - 36.0 g/dL Final   02/12/2025 11:12 AM 33.4 32.0 - 36.0 g/dL Final   10/23/2024 09:56 AM 33.3 32.0 - 36.0 g/dL Final     RDW   Date/Time Value Ref Range Status   04/23/2025 10:24 AM 13.4 11.5 - 14.5 % Final   02/12/2025 11:12 AM 12.6 11.5 - 14.5 % Final   10/23/2024 09:56 AM 13.0 11.5 - 14.5 % Final     Platelets   Date/Time Value Ref Range Status   04/23/2025 10:24  (L) 150 - 450 x10*3/uL Final   02/12/2025 11:12  150 - 450 x10*3/uL Final   10/23/2024 09:56  (L) 150 - 450 x10*3/uL Final     MPV   Date/Time Value Ref Range Status   07/31/2019 10:08 AM 10.6 7.0 - 12.6 CU Final   05/14/2019 03:51 PM 11.0 7.0 - 12.6 CU Final   02/19/2019 10:07 AM 10.5 7.0 - 12.6 CU Final     Neutrophils %   Date/Time Value Ref Range Status   04/23/2025 10:24 AM 72.8 40.0 - 80.0 % Final   02/12/2025 11:12 AM 67.3 40.0 - 80.0 % Final   10/23/2024 09:56 AM 51.1 40.0 - 80.0 % Final     Immature Granulocytes %, Automated   Date/Time Value Ref Range Status   04/23/2025 10:24 AM 0.0 0.0 - 0.9 % Final     Comment:     Immature Granulocyte Count (IG) includes promyelocytes, myelocytes and metamyelocytes but does not include bands. Percent differential counts (%) should be interpreted in the context of the absolute cell counts (cells/UL).   02/12/2025 11:12 AM 0.2 0.0 - 0.9 % Final     Comment:     Immature Granulocyte Count (IG) includes promyelocytes, myelocytes and metamyelocytes but does not include bands. Percent differential counts (%) should be interpreted in the context of the absolute cell counts (cells/UL).   10/23/2024 09:56 AM 0.0 0.0 - 0.9 % Final      Comment:     Immature Granulocyte Count (IG) includes promyelocytes, myelocytes and metamyelocytes but does not include bands. Percent differential counts (%) should be interpreted in the context of the absolute cell counts (cells/UL).     Lymphocytes %   Date/Time Value Ref Range Status   04/23/2025 10:24 AM 13.4 13.0 - 44.0 % Final   02/12/2025 11:12 AM 16.8 13.0 - 44.0 % Final   10/23/2024 09:56 AM 34.2 13.0 - 44.0 % Final     Monocytes %   Date/Time Value Ref Range Status   04/23/2025 10:24 AM 12.0 2.0 - 10.0 % Final   02/12/2025 11:12 AM 12.9 2.0 - 10.0 % Final   10/23/2024 09:56 AM 11.9 2.0 - 10.0 % Final     Eosinophils %   Date/Time Value Ref Range Status   04/23/2025 10:24 AM 0.9 0.0 - 6.0 % Final   02/12/2025 11:12 AM 1.8 0.0 - 6.0 % Final   10/23/2024 09:56 AM 1.4 0.0 - 6.0 % Final     Basophils %   Date/Time Value Ref Range Status   04/23/2025 10:24 AM 0.9 0.0 - 2.0 % Final   02/12/2025 11:12 AM 1.0 0.0 - 2.0 % Final   10/23/2024 09:56 AM 1.4 0.0 - 2.0 % Final     Neutrophils Absolute   Date/Time Value Ref Range Status   04/23/2025 10:24 AM 3.08 1.20 - 7.70 x10*3/uL Final     Comment:     Percent differential counts (%) should be interpreted in the context of the absolute cell counts (cells/uL).   02/12/2025 11:12 AM 3.29 1.20 - 7.70 x10*3/uL Final     Comment:     Percent differential counts (%) should be interpreted in the context of the absolute cell counts (cells/uL).   10/23/2024 09:56 AM 1.12 (L) 1.20 - 7.70 x10*3/uL Final     Comment:     Percent differential counts (%) should be interpreted in the context of the absolute cell counts (cells/uL).     Immature Granulocytes Absolute, Automated   Date/Time Value Ref Range Status   04/23/2025 10:24 AM 0.00 0.00 - 0.70 x10*3/uL Final   02/12/2025 11:12 AM 0.01 0.00 - 0.70 x10*3/uL Final   10/23/2024 09:56 AM 0.00 0.00 - 0.70 x10*3/uL Final     Lymphocytes Absolute   Date/Time Value Ref Range Status   04/23/2025 10:24 AM 0.57 (L) 1.20 - 4.80 x10*3/uL  Final   02/12/2025 11:12 AM 0.82 (L) 1.20 - 4.80 x10*3/uL Final   10/23/2024 09:56 AM 0.75 (L) 1.20 - 4.80 x10*3/uL Final     Monocytes Absolute   Date/Time Value Ref Range Status   04/23/2025 10:24 AM 0.51 0.10 - 1.00 x10*3/uL Final   02/12/2025 11:12 AM 0.63 0.10 - 1.00 x10*3/uL Final   10/23/2024 09:56 AM 0.26 0.10 - 1.00 x10*3/uL Final     Eosinophils Absolute   Date/Time Value Ref Range Status   04/23/2025 10:24 AM 0.04 0.00 - 0.70 x10*3/uL Final   02/12/2025 11:12 AM 0.09 0.00 - 0.70 x10*3/uL Final   10/23/2024 09:56 AM 0.03 0.00 - 0.70 x10*3/uL Final     Basophils Absolute   Date/Time Value Ref Range Status   04/23/2025 10:24 AM 0.04 0.00 - 0.10 x10*3/uL Final   02/12/2025 11:12 AM 0.05 0.00 - 0.10 x10*3/uL Final   10/23/2024 09:56 AM 0.03 0.00 - 0.10 x10*3/uL Final   A-C. BONE MARROW CLOT,  CORE BIOPSY, AND ASPIRATE, LEFT ILIAC CREST:  -- LIMITED, NORMOCELLULAR BONE MARROW (30%) WITH MATURING TRILINEAGE HEMATOPOIESIS.  -- APPROXIMATELY 7%-9% LAMBDA-RESTRICTED PLASMA CELLS CONSISTENT WITH PLASMA CELL NEOPLASM, SEE NOTE.     NOTE: The core biopsy is limited for evaluation due to considerable hemorrhagic disruption artifact. There is no morphologic evidence of dysplasia. Correlation with pending molecular/cytogenetic studies is recommended to formally exclude an underlying myeloid neoplasm. Clinical and radiological correlation is recommended.  Assessment/Plan      Smodering multiple myeloma, felt to be a high risk of progression into multiple myeloma.   Started on Revlimid/decadron in 2014- see HPI for the detail Hx.  03/23/2023: Patient with a diagnosis of smoldering multiple myeloma currently being treated with Revlimid.  On 5 mg p.o. day 1 to day 21 of a 28-day cycle.  Review of immuno labs indicate stability  of disease with no evidence of progression.  Free light chain assay today is within the normal limits including the kappa lambda ratio.     No need to change current regimen.  He was initially started  on Revlimid plus dexamethasone but changed to single agent Revlimid if being given a maintenance regimen of 5 mg a day 1 today 21.  This is very well-tolerated tolerated and disease is well  controlled return to clinic in 4 months CBC CMP free light chain assay kappa lambda ratio.     7/24/2023: Reviewed patient's regimen and immunoglobin logical assay.  No need to  on maintenance Revlimid.  It is well-tolerated.  We will continue to monitor q. for monthly CBC CMP  SPEP and free light chain assay.  Discussed with patient and he is in agreement.  He is to return to clinic and be reviewed every 4 months  : this plan is also acceptable to patient.     8/2/2024: Progressive Thrombocytopenia: SMM: Will order BMBX: RTC 3 months.    5/1/2025: No need to  on maintenance Revlimid.  It is well-tolerated.  We will continue to monitor q. for monthly CBC CMP  SPEP and free light chain assay. Stable disease No change : RTC 6 months     Diagnoses and all orders for this visit:  Multiple myeloma in remission (Multi)  -     Clinic Appointment Request Follow Up  -     Clinic Appointment Request Follow Up (review lab trends); Future  Multiple myeloma not having achieved remission (Multi)  -     lenalidomide (Revlimid) 5 mg capsule; Take 1 capsule (5 mg total) by mouth once daily for 21 days.  Daily for D1-D21 every 28 days           Symone Gomez MD                              [1]   Family History  Problem Relation Name Age of Onset    Cancer Father Amarjit

## 2025-05-01 NOTE — PATIENT INSTRUCTIONS
Today you met with your hematologist/oncologist.  Recent labs were discussed and questions answered.  Scheduling orders were placed.  While we appreciate that you verbalized understanding, if any questions arise after leaving, please do not hesitate to call the office to discuss.  [phone] Karin Gomez would like to continue to trend your labs, please have labs drawn every 3 months. Those are in the computer as a standing order and can be drawn at any  lab. Dr Gomez will see you in 6 months to review. Please have them drawn one week prior to your appointment so all labs are reported in time to discuss at your appointment.

## 2025-05-02 RX ORDER — LENALIDOMIDE 5 MG/1
CAPSULE ORAL
Refills: 0 | OUTPATIENT
Start: 2025-05-02

## 2025-05-05 NOTE — PROGRESS NOTES
Patient is a 69 y.o. male presenting for followup with [facility] of BPH, and for prostate cancer screening.    SUBJECTIVE:  HPI   He presents for follow up.   His last PSA was 0.63 on 7/24.  He has history of BPH, treated with finasteride.  He has no urinary complaints today.    Medical History[1]  Surgical History[2]     Review of Systems   All systems have been reviewed and are negative unless otherwise noted in the HPI.    OBJECTIVE:  There were no vitals taken for this visit.  Physical Exam   Constitutional: No obvious distress.  Eyes: Non-injected conjunctiva, sclera clear, EOMI.  Ears/Nose/Mouth/Throat: No obvious drainage per ears or nose.  Cardiovascular: Extremities are warm and well perfused. No edema, cyanosis or pallor.  Respiratory: No audible wheezing/stridor; respirations do not appear labored.  Gastrointestinal: Abdomen soft, not distended.  Musculoskeletal: Normal ROM of extremities.  Skin: No obvious rashes or open sores.  Neurologic: Alert and oriented, CN 2-12 grossly intact.  Psychiatric: Answers questions appropriately with normal affect.  Hematologic/Lymphatic/Immunologic: No obvious bruises or sites of spontaneous bleeding.  Genitourinary: No CVA tenderness, bladder not palpable. ELIAN: There is a 2-3 mm midline nodule on the prostate.    LABS:  Lab Results   Component Value Date    WBC 4.2 (L) 04/23/2025    HGB 14.3 04/23/2025    HCT 42.0 04/23/2025    MCV 98 04/23/2025     (L) 04/23/2025    GLUCOSE 133 (H) 04/23/2025    CALCIUM 8.7 04/23/2025     04/23/2025    K 3.3 (L) 04/23/2025    CO2 25 04/23/2025     04/23/2025    BUN 15 04/23/2025    CREATININE 0.85 04/23/2025    EGFR >90 04/23/2025    PSA 0.63 07/25/2024     IMAGING:  PROCEDURES:  Bladder scan: PVR 1 mL  5/6/25    ASSESSMENT/PLAN:  Problem List Items Addressed This Visit    None  Visit Diagnoses         Microscopic hematuria    -  Primary      Urinary symptom or sign        Relevant Orders    POCT UA Automated manually  resulted (Completed)    Measure post void residual (Completed)      Prostate cancer screening          Prostate nodule        Relevant Orders    PSA           He has history of BPH, treated with finasteride 5 mg.     He is followed for prostate cancer screening. He will repeat PSA in a month. There was a 2-3 mm midline nodule on prostate exam today. He will follow up in 4 months for repeat exam.    PSA summary  07/25/24 0.63  12/28/23 0.47  07/12/23 0.40  12/03/21 0.4  12/03/21 0.31    Urine had trace blood today and will be sent for microscopic analysis.    All questions were answered to the patient’s satisfaction.  Patient agrees with the plan and wishes to proceed.  Follow-up will be scheduled appropriately.     Scribe Attestation  By signing my name below, I, Roseline Hays,   attest that this documentation has been prepared under the direction and in the presence of Junito Mendosa MD.         [1]   Past Medical History:  Diagnosis Date    Hypertension     Malignant (primary) neoplasm, unspecified (Multi)     Cancer    Other conditions influencing health status     Overexertion From Lifting    Pain in unspecified limb     Limb pain    Personal history of other diseases of the circulatory system     History of hypertension    Personal history of other diseases of the musculoskeletal system and connective tissue     History of low back pain    Personal history of other infectious and parasitic diseases     History of herpes zoster    Radiculopathy, lumbosacral region     Lumbosacral neuritis    Sprain of ligaments of lumbar spine, initial encounter     Low back sprain   [2]   Past Surgical History:  Procedure Laterality Date    NASAL SEPTUM SURGERY      OTHER SURGICAL HISTORY  09/26/2013    Uvulectomy    SHOULDER SURGERY  09/26/2013    Shoulder Surgery    TONSILLECTOMY  09/26/2013    Tonsillectomy    VASECTOMY

## 2025-05-06 ENCOUNTER — APPOINTMENT (OUTPATIENT)
Dept: UROLOGY | Facility: CLINIC | Age: 70
End: 2025-05-06
Payer: MEDICARE

## 2025-05-06 DIAGNOSIS — R31.29 MICROSCOPIC HEMATURIA: Primary | ICD-10-CM

## 2025-05-06 DIAGNOSIS — N40.2 PROSTATE NODULE: ICD-10-CM

## 2025-05-06 DIAGNOSIS — R39.9 URINARY SYMPTOM OR SIGN: ICD-10-CM

## 2025-05-06 DIAGNOSIS — Z12.5 PROSTATE CANCER SCREENING: ICD-10-CM

## 2025-05-06 LAB
POC BILIRUBIN, URINE: NEGATIVE
POC BLOOD, URINE: ABNORMAL
POC GLUCOSE, URINE: NEGATIVE MG/DL
POC KETONES, URINE: NEGATIVE MG/DL
POC LEUKOCYTES, URINE: NEGATIVE
POC NITRITE,URINE: NEGATIVE
POC PH, URINE: 6 PH
POC PROTEIN, URINE: NEGATIVE MG/DL
POC SPECIFIC GRAVITY, URINE: 1.01
POC UROBILINOGEN, URINE: 0.2 EU/DL

## 2025-05-06 PROCEDURE — G2211 COMPLEX E/M VISIT ADD ON: HCPCS | Performed by: UROLOGY

## 2025-05-06 PROCEDURE — 81003 URINALYSIS AUTO W/O SCOPE: CPT | Performed by: UROLOGY

## 2025-05-06 PROCEDURE — 99214 OFFICE O/P EST MOD 30 MIN: CPT | Performed by: UROLOGY

## 2025-05-06 PROCEDURE — 1159F MED LIST DOCD IN RCRD: CPT | Performed by: UROLOGY

## 2025-05-06 PROCEDURE — 1160F RVW MEDS BY RX/DR IN RCRD: CPT | Performed by: UROLOGY

## 2025-05-06 PROCEDURE — 1036F TOBACCO NON-USER: CPT | Performed by: UROLOGY

## 2025-05-09 PROBLEM — R31.29 MICROSCOPIC HEMATURIA: Status: ACTIVE | Noted: 2025-05-09

## 2025-05-09 PROBLEM — N40.2 PROSTATE NODULE: Status: ACTIVE | Noted: 2025-05-09

## 2025-05-09 PROBLEM — Z12.5 PROSTATE CANCER SCREENING: Status: ACTIVE | Noted: 2024-07-24

## 2025-06-04 ENCOUNTER — APPOINTMENT (OUTPATIENT)
Dept: OTOLARYNGOLOGY | Facility: CLINIC | Age: 70
End: 2025-06-04
Payer: MEDICARE

## 2025-06-04 ENCOUNTER — PREP FOR PROCEDURE (OUTPATIENT)
Dept: OTOLARYNGOLOGY | Facility: HOSPITAL | Age: 70
End: 2025-06-04

## 2025-06-04 VITALS — BODY MASS INDEX: 30.52 KG/M2 | HEIGHT: 71 IN | TEMPERATURE: 96.4 F | WEIGHT: 218 LBS

## 2025-06-04 DIAGNOSIS — G47.33 OBSTRUCTIVE SLEEP APNEA: Primary | ICD-10-CM

## 2025-06-04 DIAGNOSIS — H93.13 TINNITUS OF BOTH EARS: ICD-10-CM

## 2025-06-04 DIAGNOSIS — T48.5X5A RHINITIS MEDICAMENTOSA: ICD-10-CM

## 2025-06-04 DIAGNOSIS — H90.3 SENSORINEURAL HEARING LOSS (SNHL) OF BOTH EARS: ICD-10-CM

## 2025-06-04 DIAGNOSIS — J31.0 RHINITIS MEDICAMENTOSA: ICD-10-CM

## 2025-06-04 DIAGNOSIS — J31.0 RHINITIS, UNSPECIFIED TYPE: ICD-10-CM

## 2025-06-04 PROCEDURE — 3008F BODY MASS INDEX DOCD: CPT | Performed by: OTOLARYNGOLOGY

## 2025-06-04 PROCEDURE — 99214 OFFICE O/P EST MOD 30 MIN: CPT | Performed by: OTOLARYNGOLOGY

## 2025-06-04 PROCEDURE — 1036F TOBACCO NON-USER: CPT | Performed by: OTOLARYNGOLOGY

## 2025-06-04 PROCEDURE — 1159F MED LIST DOCD IN RCRD: CPT | Performed by: OTOLARYNGOLOGY

## 2025-06-04 RX ORDER — FLUTICASONE PROPIONATE 50 MCG
SPRAY, SUSPENSION (ML) NASAL
Qty: 1 ML | Refills: 11 | Status: SHIPPED | OUTPATIENT
Start: 2025-06-04

## 2025-06-04 RX ORDER — OXYMETAZOLINE HCL 0.05 %
2 SPRAY, NON-AEROSOL (ML) NASAL ONCE
OUTPATIENT
Start: 2025-06-04 | End: 2025-06-04

## 2025-06-04 NOTE — PROGRESS NOTES
Chief Complaint   Patient presents with    Follow-up     LOV 4/25 RETURNED APAP, ONLY TRIED TWICE, COULD HANDLE PRESSURE GOING INTO NOSE      Date of Evaluation: 6/4/2025   Kartik was seen today for follow-up.  Diagnoses and all orders for this visit:  Obstructive sleep apnea (Primary)  Sensorineural hearing loss (SNHL) of both ears  Tinnitus of both ears  Rhinitis medicamentosa  Rhinitis, unspecified type  -     fluticasone (Flonase) 50 mcg/actuation nasal spray; 2 sprays each nostril daily.  Shake gently. Before first use, prime pump. After use, clean tip and replace cap.         PLAN  We discussed the medical sequela of untreated moderate to severe obstructive sleep apnea to include hypertension atrial fibrillation heart attack stroke dementia and death.  I have underscored the importance of treating sleep apnea.  He would like to pursue inspire since he cannot tolerate CPAP.  I have recommended that we proceed with a drug-induced sleep endoscopy.  He also has some rhinitis medicamentosa.  I have asked him to stop using Vicks and use Flonase instead.  Consider hearing aids for tinnitus and hearing loss      HPI  He returns in follow-up for obstructive sleep apnea.  He had a sleep study on April 9, 2025 that showed an AHI 3% of 32 and AHI 4% 23.  Oxygen sanjeev 78.9%.  I started him on AutoPap 8-18.  He tried CPAP and could not tolerate it at all.  He tried wearing it while he was awake watching television and he could just not adjust to something being on his face.  He had claustrophobia and anxiety with it.  He would like to consider inspire.  He does have nasal obstruction.  He has been using Vicks on a nightly basis for the past year.  Kartik Marquez is a 69 y.o. male with bilateral constant monotonal tinnitus.  He has a history of noise exposure through work with the engine sound closest to the left ear.  He denies aural fullness.  His audiogram shows bilateral moderate to severe sensorineural hearing loss with  "good word recognition and normal tympanometry.  A second issue is 1 of snoring and possible sleep apnea.  He has a history of septoplasty and UPPP years ago.  This helped temporarily.  He had a return of snoring and he tried a dental appliance which helped temporarily and he has had a return of snoring.  He does not sleep well.  He wakes up very tired.  He has a history of multiple myeloma.    Physical Exam:  Last Recorded Vitals  Temperature 35.8 °C (96.4 °F), height 1.801 m (5' 10.9\"), weight 98.9 kg (218 lb). , Body mass index is 30.49 kg/m².  []General appearance: Well-developed, well-nourished in no acute distress, conversant with normal voice quality    Head/face: No erythema or edema or facial tenderness, and normal facial nerve function bilaterally    External ear: Clear external auditory canals with normal pinnae  Tube status: N/A  Middle ear: Tympanic membranes intact and mobile, middle ears normal.  Tympanic membrane perforation: N/A  Mastoid bowl: N/A  Hearing: Normal conversational awareness at normal speech thresholds    Nose visualized using: Anterior rhinoscopy  Nasal dorsum: Nontraumatic midline appearance  Septum: Midline, nonobstructing  Inferior turbinates: Normal, pink  Secretions: Dry    Oral cavity and oropharynx: Normal  Teeth: Good condition  Floor of mouth: without lesions  Palate: Normal hard palate, soft palate and status post UPPP  Oropharynx: Clear, no lesions present  Buccal mucosa: Normal without masses or lesions  Lips: Normal    Nasopharynx: Inadequate mirror exam secondary to gag/anatomy    Neck:  Salivary glands: Normal bilateral parotid and submandibular glands by inspection and palpation.  Non-thyroid masses: No palpable masses or significant lymphadenopathy  Trachea: Midline  Thyroid: No thyromegaly or palpable nodules  Temporomandibular joint: Nontender  Cervical range of motion: Normal    Neurologic exam: Alert and oriented x3, appropriate affect.  Cranial nerves II-XII " normal bilaterally  Extraocular movement: Extraocular movement intact, normal gaze alignment     Past Medical History:   Diagnosis Date    Hypertension     Malignant (primary) neoplasm, unspecified (Multi)     Cancer    Other conditions influencing health status     Overexertion From Lifting    Pain in unspecified limb     Limb pain    Personal history of other diseases of the circulatory system     History of hypertension    Personal history of other diseases of the musculoskeletal system and connective tissue     History of low back pain    Personal history of other infectious and parasitic diseases     History of herpes zoster    Radiculopathy, lumbosacral region     Lumbosacral neuritis    Sprain of ligaments of lumbar spine, initial encounter     Low back sprain      Past Surgical History:   Procedure Laterality Date    NASAL SEPTUM SURGERY      OTHER SURGICAL HISTORY  09/26/2013    Uvulectomy    SHOULDER SURGERY  09/26/2013    Shoulder Surgery    TONSILLECTOMY  09/26/2013    Tonsillectomy    VASECTOMY            Medications:   Current Outpatient Medications   Medication Instructions    amLODIPine (NORVASC) 10 mg, oral, Daily    aspirin 81 mg, Daily    finasteride (PROSCAR) 5 mg, oral, Daily    fluticasone (Flonase) 50 mcg/actuation nasal spray 2 sprays each nostril daily.  Shake gently. Before first use, prime pump. After use, clean tip and replace cap.    gabapentin (NEURONTIN) 300 mg, Once daily (morning) M-F (5 days a week)    hydroCHLOROthiazide (HYDRODIURIL) 25 mg, oral, Daily    hydrocortisone (Anusol-HC) 2.5 % rectal cream rectal, 4 times daily PRN, Apply to affected areas    Klor-Con M20 20 mEq ER tablet 30 mEq, oral, Daily    lenalidomide (REVLIMID) 5 mg, oral, Daily, Daily for D1-D21 every 28 days    levothyroxine (SYNTHROID, LEVOXYL) 25 mcg, oral, Daily    losartan (COZAAR) 100 mg, oral, Daily    multivit-min/ferrous fumarate (MULTI VITAMIN ORAL) Take by mouth.    omeprazole (PRILOSEC) 20 mg, oral,  Daily before breakfast, Do not crush or chew.        Allergies:  No Known Allergies       Rebel Connell MD

## 2025-06-05 PROBLEM — G47.33 OBSTRUCTIVE SLEEP APNEA: Status: ACTIVE | Noted: 2025-06-04

## 2025-06-24 ENCOUNTER — TELEPHONE (OUTPATIENT)
Dept: HEMATOLOGY/ONCOLOGY | Facility: CLINIC | Age: 70
End: 2025-06-24
Payer: MEDICARE

## 2025-06-24 NOTE — TELEPHONE ENCOUNTER
Reason for Conversation  OTHER    Background   Calling to verify whether or not we received a Physicians Verification Form from [employer] and if so, was it signed and returned.  Please call to advise.    Disposition   No disposition on file.

## 2025-06-26 NOTE — TELEPHONE ENCOUNTER
Called and spoke to patient. Notified him that we did not receive fax from [facility] with physician verification form.  He will drop off the form on Monday. If we do receive it we will call him. Patient agreed to plan and verbalized understanding using teach back method.

## 2025-06-27 DIAGNOSIS — C90.00 MULTIPLE MYELOMA NOT HAVING ACHIEVED REMISSION (MULTI): ICD-10-CM

## 2025-06-27 RX ORDER — LENALIDOMIDE 5 MG/1
CAPSULE ORAL
Qty: 21 CAPSULE | Refills: 0 | Status: SHIPPED | OUTPATIENT
Start: 2025-06-27

## 2025-07-03 ENCOUNTER — APPOINTMENT (OUTPATIENT)
Dept: PREADMISSION TESTING | Facility: HOSPITAL | Age: 70
End: 2025-07-03
Payer: MEDICARE

## 2025-07-09 ENCOUNTER — PRE-ADMISSION TESTING (OUTPATIENT)
Dept: PREADMISSION TESTING | Facility: HOSPITAL | Age: 70
End: 2025-07-09
Payer: MEDICARE

## 2025-07-09 VITALS
TEMPERATURE: 97.3 F | HEART RATE: 55 BPM | WEIGHT: 219 LBS | SYSTOLIC BLOOD PRESSURE: 142 MMHG | BODY MASS INDEX: 29.03 KG/M2 | DIASTOLIC BLOOD PRESSURE: 82 MMHG | RESPIRATION RATE: 16 BRPM | OXYGEN SATURATION: 98 % | HEIGHT: 73 IN

## 2025-07-09 DIAGNOSIS — Z01.818 PRE-OP TESTING: Primary | ICD-10-CM

## 2025-07-09 PROCEDURE — 93005 ELECTROCARDIOGRAM TRACING: CPT

## 2025-07-09 PROCEDURE — 93010 ELECTROCARDIOGRAM REPORT: CPT | Performed by: INTERNAL MEDICINE

## 2025-07-09 ASSESSMENT — DUKE ACTIVITY SCORE INDEX (DASI)
CAN YOU WALK A BLOCK OR TWO ON LEVEL GROUND: YES
CAN YOU DO HEAVY WORK AROUND THE HOUSE LIKE SCRUBBING FLOORS OR LIFTING AND MOVING HEAVY FURNITURE: YES
DASI METS SCORE: 9.9
CAN YOU HAVE SEXUAL RELATIONS: YES
CAN YOU TAKE CARE OF YOURSELF (EAT, DRESS, BATHE, OR USE TOILET): YES
CAN YOU RUN A SHORT DISTANCE: YES
CAN YOU CLIMB A FLIGHT OF STAIRS OR WALK UP A HILL: YES
CAN YOU PARTICIPATE IN STRENOUS SPORTS LIKE SWIMMING, SINGLES TENNIS, FOOTBALL, BASKETBALL, OR SKIING: YES
CAN YOU DO YARD WORK LIKE RAKING LEAVES, WEEDING OR PUSHING A MOWER: YES
CAN YOU DO MODERATE WORK AROUND THE HOUSE LIKE VACUUMING, SWEEPING FLOORS OR CARRYING GROCERIES: YES
CAN YOU DO LIGHT WORK AROUND THE HOUSE LIKE DUSTING OR WASHING DISHES: YES
TOTAL_SCORE: 58.2
CAN YOU PARTICIPATE IN MODERATE RECREATIONAL ACTIVITIES LIKE GOLF, BOWLING, DANCING, DOUBLES TENNIS OR THROWING A BASEBALL OR FOOTBALL: YES
CAN YOU WALK INDOORS, SUCH AS AROUND YOUR HOUSE: YES

## 2025-07-09 ASSESSMENT — ENCOUNTER SYMPTOMS
NEUROLOGICAL NEGATIVE: 1
HEMATOLOGIC/LYMPHATIC NEGATIVE: 1
SNORING: 1
CONSTITUTIONAL NEGATIVE: 1
PSYCHIATRIC NEGATIVE: 1
EYES NEGATIVE: 1
CARDIOVASCULAR NEGATIVE: 1
SLEEP DISTURBANCES DUE TO BREATHING: 1
ALLERGIC/IMMUNOLOGIC NEGATIVE: 1
MUSCULOSKELETAL NEGATIVE: 1
GASTROINTESTINAL NEGATIVE: 1
ENDOCRINE NEGATIVE: 1

## 2025-07-09 NOTE — PREPROCEDURE INSTRUCTIONS
Medication List            Accurate as of July 9, 2025  9:28 AM. Always use your most recent med list.                amLODIPine 10 mg tablet  Commonly known as: Norvasc  Take 1 tablet (10 mg) by mouth once daily.  Medication Adjustments for Surgery: take on the morning of surgery     aspirin 81 mg EC tablet  Additional Medication Adjustments for Surgery: Take last dose 7 days before surgery     finasteride 5 mg tablet  Commonly known as: Proscar  Take 1 tablet (5 mg) by mouth once daily.  Medication Adjustments for Surgery: Take on the morning of surgery     fluticasone 50 mcg/actuation nasal spray  Commonly known as: Flonase  2 sprays each nostril daily.  Shake gently. Before first use, prime pump. After use, clean tip and replace cap.  Medication Adjustments for Surgery: Take/Use as prescribed     gabapentin 300 mg capsule  Commonly known as: Neurontin  Medication Adjustments for Surgery: Take on the morning of surgery     hydroCHLOROthiazide 25 mg tablet  Commonly known as: HYDRODiuril  TAKE 1 TABLET BY MOUTH EVERY DAY  Medication Adjustments for Surgery: Take on the morning of surgery     Klor-Con M20 20 mEq ER tablet  Generic drug: potassium chloride CR  TAKE 1 AND 1/2 TABLETS DAILY BY MOUTH  Medication Adjustments for Surgery: Do Not take on the morning of surgery     lenalidomide 5 mg capsule  Commonly known as: Revlimid  TAKE 1 CAPSULE BY MOUTH 1 TIME A DAY FOR 21 DAYS ON THEN 7 DAYS OFF  Medication Adjustments for Surgery: Take/Use as prescribed     levothyroxine 25 mcg tablet  Commonly known as: Synthroid, Levoxyl  TAKE 1 TABLET BY MOUTH EVERY DAY  Medication Adjustments for Surgery: Take on the morning of surgery     losartan 100 mg tablet  Commonly known as: Cozaar  TAKE 1 TABLET BY MOUTH EVERY DAY  Medication Adjustments for Surgery: Do Not take on the morning of surgery     MULTI VITAMIN ORAL  Additional Medication Adjustments for Surgery: Take last dose 7 days before surgery     omeprazole 20 mg  DR head  Commonly known as: PriLOSEC  Take 1 capsule (20 mg) by mouth once daily in the morning. Take before meals. Do not crush or chew.  Medication Adjustments for Surgery: Take on the morning of surgery                                    Why must I stop eating and drinking near surgery time?  With sedation, food or liquid in your stomach can enter your lungs causing serious complications  Increases nausea and vomiting    When do I need to stop eating and drinking before my surgery?   Do not eat or drink after midnight the night before your surgery/procedure.  You may have small sips of water to take your medication.            PAT DISCHARGE INSTRUCTIONS    The Same Day Surgery (SDS) Department of the hospital where your procedure will be performed will contact you after 2:00 PM the day before your surgery. If you are scheduled on a Monday, or a Tuesday following a Monday holiday, they will call on the last business day prior to your surgery.  Please check your voicemail for any missed messages.    [facility]  [address] [place] [address] [phone]  Second Floor      [address], [address] [phone]  Second Floor  *PLEASE CALL ABOVE NUMBER FOR ARRIVAL TIME       [facility]  [address] [facility].  [place] [id number] [phone]    Please let your surgeon know if:      You develop any open sores, shingles, burning or painful urination as these may increase your risk of an infection.   You no longer wish to have the surgery.   Any other personal circumstances change that may lead to the need to cancel or defer this surgery-such as being sick or getting admitted to any hospital within one week of your planned procedure.    Your contact details change, such as a change of address or phone number.    Starting now:     Please DO NOT drink alcohol or smoke for 24 hours  before surgery. It is well known that quitting smoking can make a huge difference to your health and recovery from surgery. The longer you abstain from smoking, the better your chances of a healthy recovery. If you need help with quitting, call 0-800QUIT-NOW to be connected to a trained counselor who will discuss the best methods to help you quit.     Before your surgery:    Please stop all supplements 7 days prior to surgery. Or as directed by your surgeon.   Please stop taking NSAID pain medicine such as Advil and Motrin 7 days before surgery.    If you develop any fever, cough, cold, rashes, cuts, scratches, scrapes, urinary symptoms or infection anywhere on your body (including teeth and gums) prior to surgery, please call your surgeon’s office as soon as possible. This may require treatment to reduce the chance of cancellation on the day of surgery.    The day before your surgery:   DIET- Please follow the diet instructions at the top of your packet.   Get a good night’s rest.  Use the special soap for bathing if you have been instructed to use one.    Scheduled surgery times may change and you will be notified if this occurs - please check your personal voicemail for any updates.     On the morning of surgery:   Wear comfortable, loose fitting clothes which open in the front. Please do not wear moisturizers, creams, lotions, makeup or perfume.    Please bring with you to surgery:   Photo ID and insurance card   Current list of medicines and allergies   Pacemaker/ Defibrillator/Heart stent cards   CPAP machine and mask    Slings/ splints/ crutches   A copy of your complete advanced directive/DHPOA.    Please do NOT bring with you to surgery:   All jewelry and valuables should be left at home.   Prosthetic devices such as contact lenses, hearing aids, dentures, eyelash extensions, hairpins and body piercings must be removed prior to going in to the surgical suite.    After outpatient surgery:   A responsible  adult MUST accompany you at the time of discharge and stay with you for 24 hours after your surgery. You may NOT drive yourself home after surgery.    Do not drive, operate machinery, make critical decisions or do activities that require co-ordination or balance until after a night’s sleep.   Do not drink alcoholic beverages for 24 hours.   Instructions for resuming your medications will be provided by your surgeon.    CALL YOUR DOCTOR AFTER SURGERY IF YOU HAVE:     Chills and/or a fever of 101° F or higher.    Redness, swelling, pus or drainage from your surgical wound or a bad smell from the wound.    Lightheadedness, fainting or confusion.    Persistent vomiting (throwing up) and are not able to eat or drink for 12 hours.    Three or more loose, watery bowel movements in 24 hours (diarrhea).   Difficulty or pain while urinating( after non-urological surgery)    Pain and swelling in your legs, especially if it is only on one side.    Difficulty breathing or are breathing faster than normal.    Any new concerning symptoms.

## 2025-07-09 NOTE — SIGNIFICANT EVENT
DIAGNOSED WITH SLEEP APNEA IN 2024, HE HAD A CPAP MACHINE, BUT HE SENT IT BACK AND DOES NOT USE ANYTHING AT THIS TIME.

## 2025-07-09 NOTE — H&P (VIEW-ONLY)
CPM/PAT Evaluation       Name: Kartik Marquez (Kartik Marquez)  /Age: 1955/69 y.o.     In-Person       Chief Complaint: Sleep apnea     HPI: Kartik Marquez a 69 year old male with a past medical history of chronic low back pain, hypertension, CATRACHO, and multiple myeloma in remission. Patient presents to pre-admission testing today for perioperative risk stratification and optimization for drug induced sleep endoscopy. Patient states that he has sleep apnea and is unable to sleep well, snores, does not wake up well rested with feelings of fatigue. States that he does have a CPAP but unable to wear it due to the high pressure and uncomfortable. States that he has tried other surgical and non-surgical options in the past which only provide temporary relief. Patient states that he is interested to see if he a good candidate for surgical implantation device INSPIRE to help with his apnea. Patient denies shortness of breath, trouble breathing, chest pains, fever, chills, nausea, or vomiting. No dysuria, no abdominal pain. Denies diarrhea or constipation. No blurred vision, light headed, or dizziness.     Patient is scheduled for a drug induced sleep endoscopy with Dr. Connell on 2025.     Medical History[1]    Surgical History[2]    Social History[3]    Patient  reports that he is not currently sexually active and has had partner(s) who are female. He reports using the following method of birth control/protection: Male Sterilization.    Family History[4]    Allergies[5]    Prior to Admission medications    Medication Sig Start Date End Date Taking? Authorizing Provider   amLODIPine (Norvasc) 10 mg tablet Take 1 tablet (10 mg) by mouth once daily. 3/31/25   Matthew Thrasher DO   aspirin 81 mg EC tablet Take 1 tablet (81 mg) by mouth once daily.    Historical Provider, MD   finasteride (Proscar) 5 mg tablet Take 1 tablet (5 mg) by mouth once daily. 3/18/25   Erica Cassidy MD   fluticasone (Flonase) 50 mcg/actuation  Department of Anesthesiology  Postprocedure Note    Patient: Lexis Bradford  MRN: [id number]  YOB: 1970  Date of evaluation: 10/22/2024    Procedure Summary       Date: 10/22/24 Room / Location: [facility] MAIN OR [address] [place] [address] Jaleel Coy DDS Responsible Provider: Sonido Giles MD    Anesthesia Type: General ASA Status: 2            Anesthesia Type: General    Danni Phase I: Danni Score: 10    Danni Phase II: Danni Score: 10    Anesthesia Post Evaluation    Patient location during evaluation: PACU  Patient participation: complete - patient participated  Level of consciousness: awake  Airway patency: patent  Nausea & Vomiting: no nausea  Cardiovascular status: blood pressure returned to baseline and hemodynamically stable  Respiratory status: acceptable  Hydration status: stable  Multimodal analgesia pain management approach    No notable events documented.   nasal spray 2 sprays each nostril daily.  Shake gently. Before first use, prime pump. After use, clean tip and replace cap. 6/4/25   Rebel Connell MD   gabapentin (Neurontin) 300 mg capsule Take 1 capsule (300 mg) by mouth once daily (M-F).    Historical Provider, MD   hydroCHLOROthiazide (HYDRODiuril) 25 mg tablet TAKE 1 TABLET BY MOUTH EVERY DAY 11/18/24   Matthew Thrasher DO   Klor-Con M20 20 mEq ER tablet TAKE 1 AND 1/2 TABLETS DAILY BY MOUTH 10/17/24   Symone Gomez MD   lenalidomide (Revlimid) 5 mg capsule TAKE 1 CAPSULE BY MOUTH 1 TIME A DAY FOR 21 DAYS ON THEN 7 DAYS OFF 6/27/25   Yudith Franklin PA-C   levothyroxine (Synthroid, Levoxyl) 25 mcg tablet TAKE 1 TABLET BY MOUTH EVERY DAY 1/13/25   Efrain Ricci DO   losartan (Cozaar) 100 mg tablet TAKE 1 TABLET BY MOUTH EVERY DAY 3/12/25   Matthew Thrasher DO   multivit-min/ferrous fumarate (MULTI VITAMIN ORAL) Take by mouth.    Historical Provider, MD   omeprazole (PriLOSEC) 20 mg DR capsule Take 1 capsule (20 mg) by mouth once daily in the morning. Take before meals. Do not crush or chew. 3/14/25   Yudith Franklin PA-C   hydrocortisone (Anusol-HC) 2.5 % rectal cream Insert into the rectum 4 times a day as needed for hemorrhoids (rectal discomfort). Apply to affected areas 2/6/25 7/9/25  Efrain Ricci DO        Review of Systems   Constitutional: Negative.   HENT: Negative.     Eyes: Negative.    Cardiovascular: Negative.    Respiratory:  Positive for sleep disturbances due to breathing and snoring.    Endocrine: Negative.    Hematologic/Lymphatic: Negative.    Skin: Negative.    Musculoskeletal: Negative.    Gastrointestinal: Negative.    Genitourinary: Negative.    Neurological: Negative.    Psychiatric/Behavioral: Negative.     Allergic/Immunologic: Negative.          Physical Exam  Vitals reviewed.   Constitutional:       Appearance: Normal appearance.   HENT:      Head: Normocephalic.      Mouth/Throat:      Mouth: Mucous membranes are moist.      Pharynx:  "Oropharynx is clear.   Eyes:      Extraocular Movements: Extraocular movements intact.      Pupils: Pupils are equal, round, and reactive to light.   Cardiovascular:      Rate and Rhythm: Normal rate and regular rhythm.      Pulses: Normal pulses.      Heart sounds: Normal heart sounds.   Pulmonary:      Effort: Pulmonary effort is normal.      Breath sounds: Normal breath sounds.   Abdominal:      General: Abdomen is flat.      Palpations: Abdomen is soft.   Musculoskeletal:         General: Normal range of motion.      Cervical back: Normal range of motion.   Skin:     General: Skin is warm and dry.      Capillary Refill: Capillary refill takes less than 2 seconds.   Neurological:      General: No focal deficit present.      Mental Status: He is alert and oriented to person, place, and time.   Psychiatric:         Mood and Affect: Mood normal.         Behavior: Behavior normal.          PAT AIRWAY:   Airway:     Mallampati::  III    TM distance::  >3 FB    Neck ROM::  Full  normal          Visit Vitals  /82   Pulse 55   Temp 36.3 °C (97.3 °F)   Resp 16   Ht 1.854 m (6' 1\")   Wt 99.3 kg (219 lb)   SpO2 98%   BMI 28.89 kg/m²   Smoking Status Never   BSA 2.26 m²       DASI Risk Score      Flowsheet Row [facility]-Admission [facility]   Can you take care of yourself (eat, dress, bathe, or use toilet)?  2.75 filed at 07/09/2025 0929   Can you walk indoors, such as around your house? 1.75 filed at 07/09/2025 0929   Can you walk a block or two on level ground?  2.75 filed at 07/09/2025 0929   Can you climb a flight of stairs or walk up a hill? 5.5 filed at 07/09/2025 0929   Can you run a short distance? 8 filed at 07/09/2025 0929   Can you do light work around the house like dusting or washing dishes? 2.7 filed at 07/09/2025 0929   Can you do moderate work around the house like vacuuming, sweeping floors or carrying groceries? 3.5 filed at 07/09/2025 0929   Can you do heavy work " around the house like scrubbing floors or lifting and moving heavy furniture?  8 filed at 07/09/2025 0929   Can you do yard work like raking leaves, weeding or pushing a mower? 4.5 filed at 07/09/2025 0929   Can you have sexual relations? 5.25 filed at 07/09/2025 0929   Can you participate in moderate recreational activities like golf, bowling, dancing, doubles tennis or throwing a baseball or football? 6 filed at 07/09/2025 0929   Can you participate in strenous sports like swimming, singles tennis, football, basketball, or skiing? 7.5 filed at 07/09/2025 0929   DASI SCORE 58.2 filed at 07/09/2025 0929   METS Score (Will be calculated only when all the questions are answered) 9.9 filed at 07/09/2025 0929          Caprini DVT Assessment      Flowsheet Row [facility]-Admission Testing from 7/9/2025 in [facility]   DVT Score (IF A SCORE IS NOT CALCULATING, MUST SELECT A BMI TO COMPLETE) 6 filed at 07/09/2025 0922   Medical Factors Present cancer, chemotherapy, or previous malignancy filed at 07/09/2025 0922   Surgical Factors Minor surgery planned filed at 07/09/2025 0922   BMI (BMI MUST BE CHOSEN) 30 or less filed at 07/09/2025 0922          Modified Frailty Index    No data to display       [id number] Stroke Risk Points  Current as of just now        N/A 0 to 9 Points:      Last Change: N/A          The [id number] risk score (Lip GH, et al. 2009. © 2010 [employer]) quantifies the risk of stroke for a patient with atrial fibrillation. For patients without atrial fibrillation or under the age of 18 this score appears as N/A. Higher score values generally indicate higher risk of stroke.        This score is not applicable to this patient. Components are not calculated.          Revised Cardiac Risk Index      Flowsheet Row [facility]-Admission Testing from 7/9/2025 in [facility]   High-Risk Surgery (Intraperitoneal, Intrathoracic,Suprainguinal vascular) 0 filed at  07/09/2025 0923   History of ischemic heart disease (History of MI, History of positive exercuse test, Current chest paint considered due to myocardial ischemia, Use of nitrate therapy, ECG with pathological Q Waves) 0 filed at 07/09/2025 0923   History of congestive heart failure (pulmonary edemia, bilateral rales or S3 gallop, Paroxysmal nocturnal dyspnea, CXR showing pulmonary vascular redistribution) 0 filed at 07/09/2025 0923   History of cerebrovascular disease (Prior TIA or stroke) 0 filed at 07/09/2025 0923   Pre-operative insulin treatment 0 filed at 07/09/2025 0923   Pre-operative creatinine>2 mg/dl 0 filed at 07/09/2025 0923   Revised Cardiac Risk Calculator 0 filed at 07/09/2025 0923          Apfel Simplified Score    No data to display       Risk Analysis Index Results This Encounter    No data found in the last 10 encounters.       Stop Bang Score      Flowsheet Row [facility]-Admission [facility]   Do you snore loudly? 1 filed at 07/09/2025 0930   Do you often feel tired or fatigued after your sleep? 0  [sometimes] filed at 07/09/2025 0930   Has anyone ever observed you stop breathing in your sleep? 1 filed at 07/09/2025 0930   Do you have or are you being treated for high blood pressure? 1 filed at 07/09/2025 0930   Recent BMI (Calculated) 28.9 filed at 07/09/2025 0930   Is BMI greater than 35 kg/m2? 0=No filed at 07/09/2025 0930   Age older than 50 years old? 1=Yes filed at 07/09/2025 0930   Is your neck circumference greater than 17 inches (Male) or 16 inches (Female)? 0 filed at 07/09/2025 0930   Gender - Male 1=Yes filed at 07/09/2025 0930   STOP-BANG Total Score 5 filed at 07/09/2025 0930          Prodigy: High Risk  Total Score: 16              Prodigy Age Score      Prodigy Gender Score          ARISCAT Score for Postoperative Pulmonary Complications      Flowsheet Row [facility]-Admission [facility]   Age Calculated Score 3  filed at 07/09/2025 0923   Preoperative SpO2 0 filed at 07/09/2025 0923   Respiratory infection in the last month Either upper or lower (i.e., URI, bronchitis, pneumonia), with fever and antibiotic treatment 0 filed at 07/09/2025 0923   Preoperative anemia (Hgb less than 10 g/dl) 0 filed at 07/09/2025 0923   Surgical incision  0 filed at 07/09/2025 0923   Emergency Procedure  0 filed at 07/09/2025 0923          Blaze Perioperative Risk for Myocardial Infarction or Cardiac Arrest (ANDREW)      Flowsheet Row [facility]-Admission Testing from 7/9/2025 in [facility]   Calculated Age Score 1.36 filed at 07/09/2025 0923   Functional Status  0 filed at 07/09/2025 0923   ASA Class  -3.29 filed at 07/09/2025 0923   Creatinine 0 filed at 07/09/2025 0923   Type of Procedure  0.71 filed at 07/09/2025 0923   ANDREW Total Score  -6.47 filed at 07/09/2025 0923   ANDREW % 0.15 filed at 07/09/2025 0923          ASA: II  DASI RISK SCORE: 58.2  METS SCORE: 9.9  CHADS2 SCORE: 2.8%   REVISED CARDIAC RISK INDEX: 0.4%  STOP BANG SCORE: 5  ARISCAT: 0  ANDREW: 0.15%      Assessment and Plan:   Obstructive sleep apnea   Patient is scheduled for a drug induced sleep endoscopy with Dr. Connell on 07/17/2025.     Multiple myeloma in remission   -Continue home medication, Lenalidomide    Hypertension   -Continue home medication, Norvasc, hydrochlorothiazide, and Losartan    Labs last drawn on 04/23/2025  EKG ordered and completed today, preliminary results bradycardia, otherwise normal EKG.     Preoperative medication instructions were provided and reviewed with the patient. Any additional testing or evaluation was explained to the patient. Nothing by mouth instructions were discussed and patient's questions were answered prior to conclusion to this encounter. Patient verbalized understanding of preoperative instructions given in preadmission testing; discharge instructions available in EMR.      HUNTER Rosario          [1]   Past Medical  History:  Diagnosis Date    Hypertension     Malignant (primary) neoplasm, unspecified (Multi)     Cancer    Other conditions influencing health status     Overexertion From Lifting    Pain in unspecified limb     Limb pain    Personal history of other diseases of the circulatory system     History of hypertension    Personal history of other diseases of the musculoskeletal system and connective tissue     History of low back pain    Personal history of other infectious and parasitic diseases     History of herpes zoster    Radiculopathy, lumbosacral region     Lumbosacral neuritis    Sprain of ligaments of lumbar spine, initial encounter     Low back sprain   [2]   Past Surgical History:  Procedure Laterality Date    NASAL SEPTUM SURGERY      OTHER SURGICAL HISTORY  09/26/2013    Uvulectomy    SHOULDER SURGERY  09/26/2013    Shoulder Surgery    TONSILLECTOMY  09/26/2013    Tonsillectomy    VASECTOMY     [3]   Social History  Tobacco Use    Smoking status: Never    Smokeless tobacco: Never   Vaping Use    Vaping status: Never Used   Substance Use Topics    Alcohol use: Never    Drug use: Never   [4]   Family History  Problem Relation Name Age of Onset    Cancer Father Amarjit    [5] No Known Allergies

## 2025-07-09 NOTE — CPM/PAT H&P
CPM/PAT Evaluation       Name: Kartik Marquez (Kartik Marquez)  /Age: 1955/69 y.o.     In-Person       Chief Complaint: Sleep apnea     HPI: Kartik Marquez a 69 year old male with a past medical history of chronic low back pain, hypertension, CATRACHO, and multiple myeloma in remission. Patient presents to pre-admission testing today for perioperative risk stratification and optimization for drug induced sleep endoscopy. Patient states that he has sleep apnea and is unable to sleep well, snores, does not wake up well rested with feelings of fatigue. States that he does have a CPAP but unable to wear it due to the high pressure and uncomfortable. States that he has tried other surgical and non-surgical options in the past which only provide temporary relief. Patient states that he is interested to see if he a good candidate for surgical implantation device INSPIRE to help with his apnea. Patient denies shortness of breath, trouble breathing, chest pains, fever, chills, nausea, or vomiting. No dysuria, no abdominal pain. Denies diarrhea or constipation. No blurred vision, light headed, or dizziness.     Patient is scheduled for a drug induced sleep endoscopy with Dr. Connell on 2025.     Medical History[1]    Surgical History[2]    Social History[3]    Patient  reports that he is not currently sexually active and has had partner(s) who are female. He reports using the following method of birth control/protection: Male Sterilization.    Family History[4]    Allergies[5]    Prior to Admission medications    Medication Sig Start Date End Date Taking? Authorizing Provider   amLODIPine (Norvasc) 10 mg tablet Take 1 tablet (10 mg) by mouth once daily. 3/31/25   Matthew Thrasher DO   aspirin 81 mg EC tablet Take 1 tablet (81 mg) by mouth once daily.    Historical Provider, MD   finasteride (Proscar) 5 mg tablet Take 1 tablet (5 mg) by mouth once daily. 3/18/25   Erica Cassidy MD   fluticasone (Flonase) 50 mcg/actuation  nasal spray 2 sprays each nostril daily.  Shake gently. Before first use, prime pump. After use, clean tip and replace cap. 6/4/25   Rebel Connell MD   gabapentin (Neurontin) 300 mg capsule Take 1 capsule (300 mg) by mouth once daily (M-F).    Historical Provider, MD   hydroCHLOROthiazide (HYDRODiuril) 25 mg tablet TAKE 1 TABLET BY MOUTH EVERY DAY 11/18/24   Matthew Thrasher DO   Klor-Con M20 20 mEq ER tablet TAKE 1 AND 1/2 TABLETS DAILY BY MOUTH 10/17/24   Symone Gomez MD   lenalidomide (Revlimid) 5 mg capsule TAKE 1 CAPSULE BY MOUTH 1 TIME A DAY FOR 21 DAYS ON THEN 7 DAYS OFF 6/27/25   Yudith Franklin PA-C   levothyroxine (Synthroid, Levoxyl) 25 mcg tablet TAKE 1 TABLET BY MOUTH EVERY DAY 1/13/25   Efrain Ricci DO   losartan (Cozaar) 100 mg tablet TAKE 1 TABLET BY MOUTH EVERY DAY 3/12/25   Matthew Thrasher DO   multivit-min/ferrous fumarate (MULTI VITAMIN ORAL) Take by mouth.    Historical Provider, MD   omeprazole (PriLOSEC) 20 mg DR capsule Take 1 capsule (20 mg) by mouth once daily in the morning. Take before meals. Do not crush or chew. 3/14/25   Yudith Franklin PA-C   hydrocortisone (Anusol-HC) 2.5 % rectal cream Insert into the rectum 4 times a day as needed for hemorrhoids (rectal discomfort). Apply to affected areas 2/6/25 7/9/25  Efrain Ricci DO        Review of Systems   Constitutional: Negative.   HENT: Negative.     Eyes: Negative.    Cardiovascular: Negative.    Respiratory:  Positive for sleep disturbances due to breathing and snoring.    Endocrine: Negative.    Hematologic/Lymphatic: Negative.    Skin: Negative.    Musculoskeletal: Negative.    Gastrointestinal: Negative.    Genitourinary: Negative.    Neurological: Negative.    Psychiatric/Behavioral: Negative.     Allergic/Immunologic: Negative.          Physical Exam  Vitals reviewed.   Constitutional:       Appearance: Normal appearance.   HENT:      Head: Normocephalic.      Mouth/Throat:      Mouth: Mucous membranes are moist.      Pharynx:  "Oropharynx is clear.   Eyes:      Extraocular Movements: Extraocular movements intact.      Pupils: Pupils are equal, round, and reactive to light.   Cardiovascular:      Rate and Rhythm: Normal rate and regular rhythm.      Pulses: Normal pulses.      Heart sounds: Normal heart sounds.   Pulmonary:      Effort: Pulmonary effort is normal.      Breath sounds: Normal breath sounds.   Abdominal:      General: Abdomen is flat.      Palpations: Abdomen is soft.   Musculoskeletal:         General: Normal range of motion.      Cervical back: Normal range of motion.   Skin:     General: Skin is warm and dry.      Capillary Refill: Capillary refill takes less than 2 seconds.   Neurological:      General: No focal deficit present.      Mental Status: He is alert and oriented to person, place, and time.   Psychiatric:         Mood and Affect: Mood normal.         Behavior: Behavior normal.          PAT AIRWAY:   Airway:     Mallampati::  III    TM distance::  >3 FB    Neck ROM::  Full  normal          Visit Vitals  /82   Pulse 55   Temp 36.3 °C (97.3 °F)   Resp 16   Ht 1.854 m (6' 1\")   Wt 99.3 kg (219 lb)   SpO2 98%   BMI 28.89 kg/m²   Smoking Status Never   BSA 2.26 m²       DASI Risk Score      Flowsheet Row [facility]-Admission Testing from 7/9/2025 in [facility]   Can you take care of yourself (eat, dress, bathe, or use toilet)?  2.75 filed at 07/09/2025 0929   Can you walk indoors, such as around your house? 1.75 filed at 07/09/2025 0929   Can you walk a block or two on level ground?  2.75 filed at 07/09/2025 0929   Can you climb a flight of stairs or walk up a hill? 5.5 filed at 07/09/2025 0929   Can you run a short distance? 8 filed at 07/09/2025 0929   Can you do light work around the house like dusting or washing dishes? 2.7 filed at 07/09/2025 0929   Can you do moderate work around the house like vacuuming, sweeping floors or carrying groceries? 3.5 filed at 07/09/2025 0929   Can you do heavy work " around the house like scrubbing floors or lifting and moving heavy furniture?  8 filed at 07/09/2025 0929   Can you do yard work like raking leaves, weeding or pushing a mower? 4.5 filed at 07/09/2025 0929   Can you have sexual relations? 5.25 filed at 07/09/2025 0929   Can you participate in moderate recreational activities like golf, bowling, dancing, doubles tennis or throwing a baseball or football? 6 filed at 07/09/2025 0929   Can you participate in strenous sports like swimming, singles tennis, football, basketball, or skiing? 7.5 filed at 07/09/2025 0929   DASI SCORE 58.2 filed at 07/09/2025 0929   METS Score (Will be calculated only when all the questions are answered) 9.9 filed at 07/09/2025 0929          Caprini DVT Assessment      Flowsheet Row [facility]-Admission Testing from 7/9/2025 in [facility]   DVT Score (IF A SCORE IS NOT CALCULATING, MUST SELECT A BMI TO COMPLETE) 6 filed at 07/09/2025 0922   Medical Factors Present cancer, chemotherapy, or previous malignancy filed at 07/09/2025 0922   Surgical Factors Minor surgery planned filed at 07/09/2025 0922   BMI (BMI MUST BE CHOSEN) 30 or less filed at 07/09/2025 0922          Modified Frailty Index    No data to display       [id number] Stroke Risk Points  Current as of just now        N/A 0 to 9 Points:      Last Change: N/A          The [id number] risk score (Lip GH, et al. 2009. © 2010 [employer]) quantifies the risk of stroke for a patient with atrial fibrillation. For patients without atrial fibrillation or under the age of 18 this score appears as N/A. Higher score values generally indicate higher risk of stroke.        This score is not applicable to this patient. Components are not calculated.          Revised Cardiac Risk Index      Flowsheet Row [facility]-Admission Testing from 7/9/2025 in [facility]   High-Risk Surgery (Intraperitoneal, Intrathoracic,Suprainguinal vascular) 0 filed at  07/09/2025 0923   History of ischemic heart disease (History of MI, History of positive exercuse test, Current chest paint considered due to myocardial ischemia, Use of nitrate therapy, ECG with pathological Q Waves) 0 filed at 07/09/2025 0923   History of congestive heart failure (pulmonary edemia, bilateral rales or S3 gallop, Paroxysmal nocturnal dyspnea, CXR showing pulmonary vascular redistribution) 0 filed at 07/09/2025 0923   History of cerebrovascular disease (Prior TIA or stroke) 0 filed at 07/09/2025 0923   Pre-operative insulin treatment 0 filed at 07/09/2025 0923   Pre-operative creatinine>2 mg/dl 0 filed at 07/09/2025 0923   Revised Cardiac Risk Calculator 0 filed at 07/09/2025 0923          Apfel Simplified Score    No data to display       Risk Analysis Index Results This Encounter    No data found in the last 10 encounters.       Stop Bang Score      Flowsheet Row [facility]-Admission Testing from 7/9/2025 in [facility]   Do you snore loudly? 1 filed at 07/09/2025 0930   Do you often feel tired or fatigued after your sleep? 0  [sometimes] filed at 07/09/2025 0930   Has anyone ever observed you stop breathing in your sleep? 1 filed at 07/09/2025 0930   Do you have or are you being treated for high blood pressure? 1 filed at 07/09/2025 0930   Recent BMI (Calculated) 28.9 filed at 07/09/2025 0930   Is BMI greater than 35 kg/m2? 0=No filed at 07/09/2025 0930   Age older than 50 years old? 1=Yes filed at 07/09/2025 0930   Is your neck circumference greater than 17 inches (Male) or 16 inches (Female)? 0 filed at 07/09/2025 0930   Gender - Male 1=Yes filed at 07/09/2025 0930   STOP-BANG Total Score 5 filed at 07/09/2025 0930          Prodigy: High Risk  Total Score: 16              Prodigy Age Score      Prodigy Gender Score          ARISCAT Score for Postoperative Pulmonary Complications      Flowsheet Row [facility]-Admission Testing from 7/9/2025 in [facility]   Age Calculated Score 3  filed at 07/09/2025 0923   Preoperative SpO2 0 filed at 07/09/2025 0923   Respiratory infection in the last month Either upper or lower (i.e., URI, bronchitis, pneumonia), with fever and antibiotic treatment 0 filed at 07/09/2025 0923   Preoperative anemia (Hgb less than 10 g/dl) 0 filed at 07/09/2025 0923   Surgical incision  0 filed at 07/09/2025 0923   Emergency Procedure  0 filed at 07/09/2025 0923          Blaze Perioperative Risk for Myocardial Infarction or Cardiac Arrest (ANRDEW)      Flowsheet Row [facility]-Admission Testing from 7/9/2025 in [facility]   Calculated Age Score 1.36 filed at 07/09/2025 0923   Functional Status  0 filed at 07/09/2025 0923   ASA Class  -3.29 filed at 07/09/2025 0923   Creatinine 0 filed at 07/09/2025 0923   Type of Procedure  0.71 filed at 07/09/2025 0923   ANDREW Total Score  -6.47 filed at 07/09/2025 0923   ANDREW % 0.15 filed at 07/09/2025 0923          ASA: II  DASI RISK SCORE: 58.2  METS SCORE: 9.9  CHADS2 SCORE: 2.8%   REVISED CARDIAC RISK INDEX: 0.4%  STOP BANG SCORE: 5  ARISCAT: 0  ANDREW: 0.15%      Assessment and Plan:   Obstructive sleep apnea   Patient is scheduled for a drug induced sleep endoscopy with Dr. Connell on 07/17/2025.     Multiple myeloma in remission   -Continue home medication, Lenalidomide    Hypertension   -Continue home medication, Norvasc, hydrochlorothiazide, and Losartan    Labs last drawn on 04/23/2025  EKG ordered and completed today, preliminary results bradycardia, otherwise normal EKG.     Preoperative medication instructions were provided and reviewed with the patient. Any additional testing or evaluation was explained to the patient. Nothing by mouth instructions were discussed and patient's questions were answered prior to conclusion to this encounter. Patient verbalized understanding of preoperative instructions given in preadmission testing; discharge instructions available in EMR.      HUNTER Rosario          [1]   Past Medical  History:  Diagnosis Date    Hypertension     Malignant (primary) neoplasm, unspecified (Multi)     Cancer    Other conditions influencing health status     Overexertion From Lifting    Pain in unspecified limb     Limb pain    Personal history of other diseases of the circulatory system     History of hypertension    Personal history of other diseases of the musculoskeletal system and connective tissue     History of low back pain    Personal history of other infectious and parasitic diseases     History of herpes zoster    Radiculopathy, lumbosacral region     Lumbosacral neuritis    Sprain of ligaments of lumbar spine, initial encounter     Low back sprain   [2]   Past Surgical History:  Procedure Laterality Date    NASAL SEPTUM SURGERY      OTHER SURGICAL HISTORY  09/26/2013    Uvulectomy    SHOULDER SURGERY  09/26/2013    Shoulder Surgery    TONSILLECTOMY  09/26/2013    Tonsillectomy    VASECTOMY     [3]   Social History  Tobacco Use    Smoking status: Never    Smokeless tobacco: Never   Vaping Use    Vaping status: Never Used   Substance Use Topics    Alcohol use: Never    Drug use: Never   [4]   Family History  Problem Relation Name Age of Onset    Cancer Father Amarjit    [5] No Known Allergies

## 2025-07-10 LAB
ATRIAL RATE: 49 BPM
P AXIS: 15 DEGREES
P OFFSET: 186 MS
P ONSET: 115 MS
PR INTERVAL: 178 MS
Q ONSET: 204 MS
QRS COUNT: 8 BEATS
QRS DURATION: 102 MS
QT INTERVAL: 438 MS
QTC CALCULATION(BAZETT): 395 MS
QTC FREDERICIA: 409 MS
R AXIS: -28 DEGREES
T AXIS: 18 DEGREES
T OFFSET: 423 MS
VENTRICULAR RATE: 49 BPM

## 2025-07-17 ENCOUNTER — ANESTHESIA EVENT (OUTPATIENT)
Dept: OPERATING ROOM | Facility: HOSPITAL | Age: 70
End: 2025-07-17
Payer: MEDICARE

## 2025-07-17 ENCOUNTER — ANESTHESIA (OUTPATIENT)
Dept: OPERATING ROOM | Facility: HOSPITAL | Age: 70
End: 2025-07-17
Payer: MEDICARE

## 2025-07-17 ENCOUNTER — TELEPHONE (OUTPATIENT)
Dept: OTOLARYNGOLOGY | Facility: CLINIC | Age: 70
End: 2025-07-17

## 2025-07-17 ENCOUNTER — HOSPITAL ENCOUNTER (OUTPATIENT)
Facility: HOSPITAL | Age: 70
Setting detail: OUTPATIENT SURGERY
Discharge: HOME | End: 2025-07-17
Attending: OTOLARYNGOLOGY | Admitting: OTOLARYNGOLOGY
Payer: MEDICARE

## 2025-07-17 VITALS
WEIGHT: 219 LBS | HEART RATE: 48 BPM | SYSTOLIC BLOOD PRESSURE: 138 MMHG | OXYGEN SATURATION: 98 % | DIASTOLIC BLOOD PRESSURE: 80 MMHG | BODY MASS INDEX: 28.89 KG/M2 | RESPIRATION RATE: 15 BRPM | TEMPERATURE: 97.5 F

## 2025-07-17 DIAGNOSIS — G47.33 OBSTRUCTIVE SLEEP APNEA: Primary | ICD-10-CM

## 2025-07-17 DIAGNOSIS — E03.9 HYPOTHYROIDISM, UNSPECIFIED TYPE: ICD-10-CM

## 2025-07-17 PROCEDURE — 7100000010 HC PHASE TWO TIME - EACH INCREMENTAL 1 MINUTE: Performed by: OTOLARYNGOLOGY

## 2025-07-17 PROCEDURE — 42975 DISE EVAL SLP DO BRTH FLX DX: CPT | Performed by: OTOLARYNGOLOGY

## 2025-07-17 PROCEDURE — 2500000001 HC RX 250 WO HCPCS SELF ADMINISTERED DRUGS (ALT 637 FOR MEDICARE OP): Performed by: OTOLARYNGOLOGY

## 2025-07-17 PROCEDURE — 2500000004 HC RX 250 GENERAL PHARMACY W/ HCPCS (ALT 636 FOR OP/ED): Mod: JW | Performed by: ANESTHESIOLOGIST ASSISTANT

## 2025-07-17 PROCEDURE — 3600000001 HC OR TIME - INITIAL BASE CHARGE - PROCEDURE LEVEL ONE: Performed by: OTOLARYNGOLOGY

## 2025-07-17 PROCEDURE — 3600000006 HC OR TIME - EACH INCREMENTAL 1 MINUTE - PROCEDURE LEVEL ONE: Performed by: OTOLARYNGOLOGY

## 2025-07-17 PROCEDURE — 2720000007 HC OR 272 NO HCPCS: Performed by: OTOLARYNGOLOGY

## 2025-07-17 PROCEDURE — 7100000002 HC RECOVERY ROOM TIME - EACH INCREMENTAL 1 MINUTE: Performed by: OTOLARYNGOLOGY

## 2025-07-17 PROCEDURE — 7100000001 HC RECOVERY ROOM TIME - INITIAL BASE CHARGE: Performed by: OTOLARYNGOLOGY

## 2025-07-17 PROCEDURE — 3700000001 HC GENERAL ANESTHESIA TIME - INITIAL BASE CHARGE: Performed by: OTOLARYNGOLOGY

## 2025-07-17 PROCEDURE — 3700000002 HC GENERAL ANESTHESIA TIME - EACH INCREMENTAL 1 MINUTE: Performed by: OTOLARYNGOLOGY

## 2025-07-17 PROCEDURE — 7100000009 HC PHASE TWO TIME - INITIAL BASE CHARGE: Performed by: OTOLARYNGOLOGY

## 2025-07-17 RX ORDER — LIDOCAINE HYDROCHLORIDE 20 MG/ML
INJECTION, SOLUTION EPIDURAL; INFILTRATION; INTRACAUDAL; PERINEURAL AS NEEDED
Status: DISCONTINUED | OUTPATIENT
Start: 2025-07-17 | End: 2025-07-17

## 2025-07-17 RX ORDER — MIDAZOLAM HYDROCHLORIDE 1 MG/ML
1 INJECTION, SOLUTION INTRAMUSCULAR; INTRAVENOUS ONCE AS NEEDED
Status: DISCONTINUED | OUTPATIENT
Start: 2025-07-17 | End: 2025-07-17 | Stop reason: HOSPADM

## 2025-07-17 RX ORDER — SODIUM CHLORIDE, SODIUM LACTATE, POTASSIUM CHLORIDE, CALCIUM CHLORIDE 600; 310; 30; 20 MG/100ML; MG/100ML; MG/100ML; MG/100ML
INJECTION, SOLUTION INTRAVENOUS CONTINUOUS PRN
Status: DISCONTINUED | OUTPATIENT
Start: 2025-07-17 | End: 2025-07-17

## 2025-07-17 RX ORDER — ONDANSETRON HYDROCHLORIDE 2 MG/ML
4 INJECTION, SOLUTION INTRAVENOUS ONCE AS NEEDED
Status: DISCONTINUED | OUTPATIENT
Start: 2025-07-17 | End: 2025-07-17 | Stop reason: HOSPADM

## 2025-07-17 RX ORDER — LIDOCAINE HYDROCHLORIDE 10 MG/ML
0.1 INJECTION, SOLUTION INFILTRATION; PERINEURAL ONCE
Status: DISCONTINUED | OUTPATIENT
Start: 2025-07-17 | End: 2025-07-17 | Stop reason: HOSPADM

## 2025-07-17 RX ORDER — PROPOFOL 10 MG/ML
INJECTION, EMULSION INTRAVENOUS AS NEEDED
Status: DISCONTINUED | OUTPATIENT
Start: 2025-07-17 | End: 2025-07-17

## 2025-07-17 RX ORDER — HYDRALAZINE HYDROCHLORIDE 20 MG/ML
5 INJECTION INTRAMUSCULAR; INTRAVENOUS EVERY 30 MIN PRN
Status: DISCONTINUED | OUTPATIENT
Start: 2025-07-17 | End: 2025-07-17 | Stop reason: HOSPADM

## 2025-07-17 RX ORDER — LEVOTHYROXINE SODIUM 25 UG/1
25 TABLET ORAL DAILY
Qty: 90 TABLET | Refills: 1 | Status: SHIPPED | OUTPATIENT
Start: 2025-07-17

## 2025-07-17 RX ORDER — MEPERIDINE HYDROCHLORIDE 25 MG/ML
12.5 INJECTION INTRAMUSCULAR; INTRAVENOUS; SUBCUTANEOUS EVERY 10 MIN PRN
Status: DISCONTINUED | OUTPATIENT
Start: 2025-07-17 | End: 2025-07-17 | Stop reason: HOSPADM

## 2025-07-17 RX ORDER — ALBUTEROL SULFATE 0.83 MG/ML
2.5 SOLUTION RESPIRATORY (INHALATION) ONCE AS NEEDED
Status: DISCONTINUED | OUTPATIENT
Start: 2025-07-17 | End: 2025-07-17 | Stop reason: HOSPADM

## 2025-07-17 RX ORDER — SODIUM CHLORIDE, SODIUM LACTATE, POTASSIUM CHLORIDE, CALCIUM CHLORIDE 600; 310; 30; 20 MG/100ML; MG/100ML; MG/100ML; MG/100ML
100 INJECTION, SOLUTION INTRAVENOUS CONTINUOUS
Status: DISCONTINUED | OUTPATIENT
Start: 2025-07-17 | End: 2025-07-17 | Stop reason: HOSPADM

## 2025-07-17 RX ORDER — FENTANYL CITRATE 50 UG/ML
50 INJECTION, SOLUTION INTRAMUSCULAR; INTRAVENOUS EVERY 5 MIN PRN
Status: DISCONTINUED | OUTPATIENT
Start: 2025-07-17 | End: 2025-07-17 | Stop reason: HOSPADM

## 2025-07-17 RX ORDER — OXYMETAZOLINE HCL 0.05 %
2 SPRAY, NON-AEROSOL (ML) NASAL ONCE
Status: COMPLETED | OUTPATIENT
Start: 2025-07-17 | End: 2025-07-17

## 2025-07-17 RX ADMIN — SODIUM CHLORIDE, POTASSIUM CHLORIDE, SODIUM LACTATE AND CALCIUM CHLORIDE: 600; 310; 30; 20 INJECTION, SOLUTION INTRAVENOUS at 13:32

## 2025-07-17 RX ADMIN — PROPOFOL 100 MG: 10 INJECTION, EMULSION INTRAVENOUS at 13:35

## 2025-07-17 RX ADMIN — LIDOCAINE HYDROCHLORIDE 60 MG: 20 INJECTION, SOLUTION EPIDURAL; INFILTRATION; INTRACAUDAL; PERINEURAL at 13:35

## 2025-07-17 RX ADMIN — OXYMETAZOLINE HYDROCHLORIDE 2 SPRAY: 0.5 SPRAY NASAL at 12:59

## 2025-07-17 SDOH — HEALTH STABILITY: MENTAL HEALTH: CURRENT SMOKER: 0

## 2025-07-17 ASSESSMENT — PAIN SCALES - GENERAL
PAINLEVEL_OUTOF10: 0 - NO PAIN
PAIN_LEVEL: 2
PAINLEVEL_OUTOF10: 0 - NO PAIN
PAINLEVEL_OUTOF10: 0 - NO PAIN

## 2025-07-17 ASSESSMENT — PAIN - FUNCTIONAL ASSESSMENT
PAIN_FUNCTIONAL_ASSESSMENT: FLACC (FACE, LEGS, ACTIVITY, CRY, CONSOLABILITY)
PAIN_FUNCTIONAL_ASSESSMENT: 0-10
PAIN_FUNCTIONAL_ASSESSMENT: 0-10

## 2025-07-17 ASSESSMENT — COLUMBIA-SUICIDE SEVERITY RATING SCALE - C-SSRS
2. HAVE YOU ACTUALLY HAD ANY THOUGHTS OF KILLING YOURSELF?: NO
6. HAVE YOU EVER DONE ANYTHING, STARTED TO DO ANYTHING, OR PREPARED TO DO ANYTHING TO END YOUR LIFE?: NO
1. IN THE PAST MONTH, HAVE YOU WISHED YOU WERE DEAD OR WISHED YOU COULD GO TO SLEEP AND NOT WAKE UP?: NO

## 2025-07-17 NOTE — POST-PROCEDURE NOTE
Patient brought back from PACU.  He is alert and awake.  He denies any pain.  Tolerated snack and liquids.  Discharge instructions reviewed, verbalized understanding.

## 2025-07-17 NOTE — ANESTHESIA POSTPROCEDURE EVALUATION
Patient: Kartik Marquez    Procedure Summary       Date: 07/17/25 Room / Location: [facility] / [facility]    Anesthesia Start: 1332 Anesthesia Stop: 1345    Procedure: Drug Induced Sleep Endoscopy (Nose) Diagnosis:       Obstructive sleep apnea      BMI 30.0-30.9,adult      (Obstructive sleep apnea [G47.33])      (BMI 30.0-30.9,adult [Z68.30])    Surgeons: Rebel Connell MD Responsible Provider: Cristino Andersen MD    Anesthesia Type: MAC ASA Status: 3            Anesthesia Type: MAC    Vitals Value Taken Time   /86 07/17/25 13:55   Temp 36.4 °C (97.5 °F) 07/17/25 13:55   Pulse 54 07/17/25 13:55   Resp 15 07/17/25 13:55   SpO2 95 % 07/17/25 13:55       Anesthesia Post Evaluation    Patient location during evaluation: PACU  Patient participation: complete - patient participated  Level of consciousness: sleepy but conscious  Pain score: 2  Pain management: adequate  Multimodal analgesia pain management approach  Airway patency: patent  Cardiovascular status: acceptable  Respiratory status: acceptable  Hydration status: acceptable  Postoperative Nausea and Vomiting: none        There were no known notable events for this encounter.

## 2025-07-17 NOTE — TELEPHONE ENCOUNTER
----- Message from Rebel Connell sent at 7/17/2025  1:40 PM EDT -----  This patient had favorable anatomy on drug-induced sleep endoscopy.  Please move forward with insurance precertification for inspire

## 2025-07-17 NOTE — OP NOTE
Drug Induced Sleep Endoscopy Operative Note          Date: 2025  OR Location: [facility]    Name: Kartik Marquez : 1955 Age: 69 y.o. MRN: [id number]  Sex: male      Diagnosis  Pre-op Diagnosis    Pre-Op Diagnosis Codes:      * Obstructive sleep apnea [G47.33]     * BMI 30.0-30.9,adult [Z68.30] Post-op Diagnosis     same     Procedures    Drug Induced Sleep Endoscopy  93154 - VA DISE DYN EVAL SLEEP DISORDERED BREATHING FLX DX       Surgeons      Rebel Connell MD     Assistant:  See OR log    Procedure Summary  Anesthesia: General  Anesthesia Staff: Cristino Andersen MD   Estimated Blood Loss: none      Findings: Anterior posterior closure of the soft palate/velum with little to no lateral wall collapse    Indications: Kartik Marquez is an 69 y.o.  male  who is having surgery for OBSTRUCTIVE SLEEP APNEA.  The patient has obstructive sleep apnea and is unable to tolerate CPAP.  Drug-induced sleep endoscopy was recommended to evaluate her airway for the level of obstruction    The patient was seen in the preoperative area. The risks, benefits, complications, treatment options, non-operative alternatives, expected recovery and outcomes were discussed with the patient. The possibilities of reaction to medication, pulmonary aspiration, injury to surrounding structures, bleeding, recurrent infection, the need for additional procedures, failure to diagnose a condition, and creating a complication requiring transfusion or operation were discussed with the patient. The patient concurred with the proposed plan, giving informed consent.  The site of surgery was properly noted/marked if necessary per policy. The patient has been actively warmed in preoperative area. Preoperative antibiotics are not indicated. Venous thrombosis prophylaxis are not indicated.    Procedure Details: The patient was brought to the operating room placed on the operating table in the supine position.  After blood pressure and cardiac monitors were  applied the patient was placed under IV sedation with a propofol protocol for drug-induced sleep endoscopy.  Once the patient was unconscious, snoring and showing evidence of apnea flexible nasal endoscopy was carried out.  The velum closed in an anterior posterior direction with no significant lateral wall collapse.  The base of tongue was noted to be enlarged.  The epiglottis was normal.  The larynx appeared normal.  Complications:  None; patient tolerated the procedure well.    Disposition: PACU - hemodynamically stable.  Condition: stable         Additional Details: Favorable anatomy to consider inspire    Attending Attestation: I performed the procedure.    Rebel Connell  Phone Number: [phone]

## 2025-07-17 NOTE — ANESTHESIA PREPROCEDURE EVALUATION
Patient: Kartik Marquez    Procedure Information       Date/Time: 07/17/25 1411    Procedure: Drug Induced Sleep Endoscopy    Location: [facility] / [facility]    Surgeons: Rebel Connell MD            Relevant Problems   Cardiac   (+) Hypertension      Pulmonary   (+) Pneumonia      Neuro   (+) Lumbar radiculitis      Endocrine   (+) Hypothyroidism      Hematology   (+) Multiple myeloma not having achieved remission (Multi)   (+) Thrombocytopenia      Musculoskeletal   (+) Lumbar disc herniation      ID   (+) Pneumonia       Clinical information reviewed:    Allergies  Meds   Med Hx  Surg Hx   Fam Hx          NPO Detail:  NPO/Void Status  Carbohydrate Drink Given Prior to Surgery? : N  Date of Last Liquid: 07/17/25  Time of Last Liquid: 0730  Date of Last Solid: 07/16/25  Time of Last Solid: 2000  Last Intake Type: Clear fluids  Time of Last Void: 1200         Physical Exam    Airway  Mallampati: II  TM distance: >3 FB  Neck ROM: full  Mouth opening: 3 or more finger widths     Cardiovascular - normal exam   Dental - normal exam     Pulmonary - normal exam   Abdominal - normal exam           Anesthesia Plan    History of general anesthesia?: yes  History of complications of general anesthesia?: no    ASA 3     MAC     The patient is not a current smoker.  Patient was not previously instructed to abstain from smoking on day of procedure.  Patient did not smoke on day of procedure.  Education provided regarding risk of obstructive sleep apnea.  intravenous induction   Postoperative pain plan includes opioids.  Trial extubation is planned.  Anesthetic plan and risks discussed with patient.  Use of blood products discussed with patient who consented to blood products.    Plan discussed with CAA, attending and CRNA.

## 2025-07-24 ENCOUNTER — APPOINTMENT (OUTPATIENT)
Dept: PRIMARY CARE | Facility: CLINIC | Age: 70
End: 2025-07-24
Payer: MEDICARE

## 2025-07-24 VITALS
OXYGEN SATURATION: 97 % | HEART RATE: 78 BPM | BODY MASS INDEX: 28.76 KG/M2 | SYSTOLIC BLOOD PRESSURE: 122 MMHG | HEIGHT: 73 IN | DIASTOLIC BLOOD PRESSURE: 80 MMHG | WEIGHT: 217 LBS

## 2025-07-24 DIAGNOSIS — D69.6 THROMBOCYTOPENIA: ICD-10-CM

## 2025-07-24 DIAGNOSIS — Z00.00 ROUTINE GENERAL MEDICAL EXAMINATION AT HEALTH CARE FACILITY: Primary | ICD-10-CM

## 2025-07-24 DIAGNOSIS — N40.0 BENIGN PROSTATIC HYPERPLASIA WITHOUT LOWER URINARY TRACT SYMPTOMS: ICD-10-CM

## 2025-07-24 DIAGNOSIS — E66.3 OVERWEIGHT WITH BODY MASS INDEX (BMI) OF 28 TO 28.9 IN ADULT: ICD-10-CM

## 2025-07-24 DIAGNOSIS — E87.6 HYPOKALEMIA: ICD-10-CM

## 2025-07-24 DIAGNOSIS — Z11.59 NEED FOR HEPATITIS C SCREENING TEST: ICD-10-CM

## 2025-07-24 DIAGNOSIS — R42 DIZZINESS AFTER EXTENSION OF NECK: ICD-10-CM

## 2025-07-24 DIAGNOSIS — E03.9 HYPOTHYROIDISM, UNSPECIFIED TYPE: ICD-10-CM

## 2025-07-24 DIAGNOSIS — G47.33 OBSTRUCTIVE SLEEP APNEA: ICD-10-CM

## 2025-07-24 DIAGNOSIS — C90.00 MULTIPLE MYELOMA NOT HAVING ACHIEVED REMISSION (MULTI): ICD-10-CM

## 2025-07-24 DIAGNOSIS — I10 PRIMARY HYPERTENSION: ICD-10-CM

## 2025-07-24 DIAGNOSIS — C90.01 MULTIPLE MYELOMA IN REMISSION (MULTI): ICD-10-CM

## 2025-07-24 DIAGNOSIS — R79.9 ABNORMAL FINDING OF BLOOD CHEMISTRY, UNSPECIFIED: ICD-10-CM

## 2025-07-24 DIAGNOSIS — I10 HYPERTENSION, UNSPECIFIED TYPE: ICD-10-CM

## 2025-07-24 PROBLEM — M48.00 MULTILEVEL NEURAL FORAMINAL STENOSIS: Status: ACTIVE | Noted: 2024-05-08

## 2025-07-24 PROBLEM — J96.00 ACUTE RESPIRATORY FAILURE: Status: RESOLVED | Noted: 2023-11-15 | Resolved: 2025-07-24

## 2025-07-24 RX ORDER — AMLODIPINE BESYLATE 10 MG/1
10 TABLET ORAL DAILY
Qty: 90 TABLET | Refills: 3 | Status: SHIPPED | OUTPATIENT
Start: 2025-07-24

## 2025-07-24 RX ORDER — LOSARTAN POTASSIUM 100 MG/1
100 TABLET ORAL DAILY
Qty: 90 TABLET | Refills: 3 | Status: SHIPPED | OUTPATIENT
Start: 2025-07-24

## 2025-07-24 RX ORDER — HYDROCHLOROTHIAZIDE 25 MG/1
25 TABLET ORAL DAILY
Qty: 90 TABLET | Refills: 3 | Status: SHIPPED | OUTPATIENT
Start: 2025-07-24

## 2025-07-24 RX ORDER — LENALIDOMIDE 5 MG/1
5 CAPSULE ORAL DAILY
Qty: 21 CAPSULE | Refills: 0 | Status: SHIPPED | OUTPATIENT
Start: 2025-07-24

## 2025-07-24 ASSESSMENT — ACTIVITIES OF DAILY LIVING (ADL)
GROCERY_SHOPPING: INDEPENDENT
BATHING: INDEPENDENT
MANAGING_FINANCES: INDEPENDENT
DRESSING: INDEPENDENT
TAKING_MEDICATION: INDEPENDENT

## 2025-07-24 NOTE — PROGRESS NOTES
Subjective   Patient ID: Kartik Marquez is a 69 y.o. male who presents for Annual Exam.  Today he is accompanied by alone.     HPI  Overall patient is doing well.   Immunization: Tdap: 2022  Influenza vaccine: gets yearly   Shingrix: 2020 x2   PCV13: 2019  PPSV23: 2020  RSV: indicated  COVID-19 vaccine #1: 2021 #2: 2021  Colon Cancer Screening: no family history. Last completed 2018 with 10 year clearance  Hep C one time screening: never completed  Tobacco: Denies use, never used  EtOH: none  Other drugs: denies use    Dentist - follows regularly  Optometry - follows regularly    Acute concern:  Wants to get carotid US b/l with Dr. Lambert. Occasionally has lightheadedness with neck extension. No transient vision loss.    Chronic Conditions:  Multiple myeloma in remission, thromocytopenia, hypokalemia  Follows with oncology, stable, last visit 5/1/2025 with 6 month follow up. No back pain.   On lenalidomid 5 mg daily for 21 days, off 7, every 28 day cycle  Hypothyroidism  On levothyroxine 25 mcg  No constipation, no chills, no hair loss  TSH last on 7/12/23 was 2.16  Hypertension  Stable today, 122/80  On amlodipine 10 mg, hydrochlorothiazide 25 mg, losartan 100 mg daily  CATRACHO  Follows with sleep medicine and ENT  Trying to get INSPIRE  Cannot tolerate CPAP  Will have follow up August 1st to review his INSPIRE results and insurance coverage  BPH, prostate nodules, microscopic hematuria  Follows with urology, will have follow up in September  Stable without urinary symptoms  On flomax 5 mg    Medications Ordered Prior to Encounter[1]     Allergies[2]    Immunization History   Administered Date(s) Administered    COVID-19, mRNA, LNP-S, PF, 30 mcg/0.3 mL dose 03/12/2021, 04/02/2021, 08/19/2021    Flu vaccine (IIV4), preservative free *Check age/dose* 09/10/2020, 09/22/2021    Influenza Whole 10/20/2016, 12/15/2017    Influenza, Seasonal, Quadrivalent, Adjuvanted 09/27/2022, 10/17/2023    Influenza, injectable, MDCK,  "quadrivalent 10/15/2019    Influenza, trivalent, adjuvanted 09/24/2024    Pfizer COVID-19 vaccine, 12 years and older, (30mcg/0.3mL) (Comirnaty) 12/01/2023, 10/08/2024    Pfizer COVID-19 vaccine, bivalent, age 12 years and older (30 mcg/0.3 mL) 11/11/2022    Pfizer Gray Cap SARS-CoV-2 04/01/2022    Pneumococcal conjugate vaccine, 13-valent (PREVNAR 13) 10/22/2019    Pneumococcal polysaccharide vaccine, 23-valent, age 2 years and older (PNEUMOVAX 23) 10/29/2020    Tdap vaccine, age 7 year and older (BOOSTRIX, ADACEL) 09/13/2022    Zoster vaccine, recombinant, adult (SHINGRIX) 03/03/2020, 10/29/2020         Review of Systems  All pertinent positive symptoms are included in the history of present illness.  All other systems have been reviewed and are negative and noncontributory to this patient's current ailments.     Objective   /80   Pulse 78   Ht 1.854 m (6' 1\")   Wt 98.4 kg (217 lb)   SpO2 97%   BMI 28.63 kg/m²   BSA: 2.25 meters squared  Pre-Admission Testing on 07/09/2025   Component Date Value Ref Range Status    Ventricular Rate 07/09/2025 49  BPM Final    Atrial Rate 07/09/2025 49  BPM Final    NV Interval 07/09/2025 178  ms Final    QRS Duration 07/09/2025 102  ms Final    QT Interval 07/09/2025 438  ms Final    QTC Calculation(Bazett) 07/09/2025 395  ms Final    P Axis 07/09/2025 15  degrees Final    R Axis 07/09/2025 -28  degrees Final    T Axis 07/09/2025 18  degrees Final    QRS Count 07/09/2025 8  beats Final    Q Onset 07/09/2025 204  ms Final    P Onset 07/09/2025 115  ms Final    P Offset 07/09/2025 186  ms Final    T Offset 07/09/2025 423  ms Final    QTC Fredericia 07/09/2025 409  ms Final       Physical Exam  CONSTITUTIONAL - well nourished, well developed, looks like stated age, in no acute distress, not ill-appearing, and not tired appearing  SKIN - normal skin color and pigmentation, normal skin turgor without rash, lesions, or nodules visualized  HEAD - no trauma, normocephalic  EYES " - extraocular muscles are intact, and normal external exam  ENT - TM's intact, no injection, no signs of infection, uvula midline, normal tongue movement and throat normal, no exudate  NECK - supple without rigidity, no neck mass was observed, no thyromegaly or thyroid nodules, no carotid bruits  CHEST - clear to auscultation, no wheezing, no crackles and no rales, good effort  CARDIAC - regular rate and regular rhythm, no skipped beats, no murmur  EXTREMITIES - no edema, no deformities  NEUROLOGICAL - normal gait, normal balance, normal motor, no ataxia; alert, oriented and no focal signs  PSYCHIATRIC - alert, pleasant and cordial, age-appropriate      Assessment/Plan   Diagnoses and all orders for this visit:  Routine general medical examination at health care facility  -     1 Year Follow Up In Primary Care - Wellness Exam; Future  Primary hypertension  Comments:  Well controlled. Continue losartan 100mg, hctz 25 mg, and amlodipine 10 mg  Hypokalemia  Comments:  will continue to monitor. Continue multivitamin daily  Hypothyroidism, unspecified type  Comments:  Stable. Will follow TSH. Continue levothyroxine 25 mg daily  Orders:  -     TSH; Future  Obstructive sleep apnea  Comments:  Continue to follow with sleep medicine and ENT  Multiple myeloma in remission (Multi)  Comments:  Stable. Continue lenalidomid 5 mg and following with Oncology  Thrombocytopenia  Comments:  Stable without symptoms. Will continue to monitor. Likely due to MM  Benign prostatic hyperplasia without lower urinary tract symptoms  Comments:  Stable. Continue flomax 5 mg and following with urology  Need for hepatitis C screening test  -     Hepatitis C antibody; Future  Overweight with body mass index (BMI) of 28 to 28.9 in adult  Comments:  Regular exercise and a health, low carb, high fiber diet will help with maintaining a healthy weight  Orders:  -     TSH; Future  -     Lipid panel; Future  -     Hemoglobin A1C - Lab collect;  Future  Abnormal finding of blood chemistry, unspecified  -     Hemoglobin A1C - Lab collect; Future  Hypertension, unspecified type  -     amLODIPine (Norvasc) 10 mg tablet; Take 1 tablet (10 mg) by mouth once daily.  -     hydroCHLOROthiazide (HYDRODiuril) 25 mg tablet; Take 1 tablet (25 mg) by mouth once daily.  -     losartan (Cozaar) 100 mg tablet; Take 1 tablet (100 mg) by mouth once daily.  Dizziness after extension of neck  Comments:  Will refer to requested cardiologist. Patient desired carotid US b/l for occasional lightheadedness with neck extension.  Orders:  -     Lipid panel; Future  -     Referral to Cardiology; Future      Please complete all ordered lab work and screenings. Will reach out to you if the results warrant any change in management.     Discussed the plan of care with the patient and they provided their understanding. All questions dicussed and answered during visit.         [1]   Current Outpatient Medications on File Prior to Visit   Medication Sig Dispense Refill    aspirin 81 mg EC tablet Take 1 tablet (81 mg) by mouth once daily.      finasteride (Proscar) 5 mg tablet Take 1 tablet (5 mg) by mouth once daily. 90 tablet 3    fluticasone (Flonase) 50 mcg/actuation nasal spray 2 sprays each nostril daily.  Shake gently. Before first use, prime pump. After use, clean tip and replace cap. 1 mL 11    gabapentin (Neurontin) 300 mg capsule Take 1 capsule (300 mg) by mouth 3 times a day.      Klor-Con M20 20 mEq ER tablet TAKE 1 AND 1/2 TABLETS DAILY BY MOUTH 135 tablet 3    lenalidomide (Revlimid) 5 mg capsule TAKE 1 CAPSULE BY MOUTH 1 TIME A DAY FOR 21 DAYS ON THEN 7 DAYS OFF 21 capsule 0    levothyroxine (Synthroid, Levoxyl) 25 mcg tablet TAKE 1 TABLET BY MOUTH EVERY DAY 90 tablet 1    multivit-min/ferrous fumarate (MULTI VITAMIN ORAL) Take by mouth.      omeprazole (PriLOSEC) 20 mg DR capsule Take 1 capsule (20 mg) by mouth once daily in the morning. Take before meals. Do not crush or  chew. 90 capsule 3    [DISCONTINUED] amLODIPine (Norvasc) 10 mg tablet Take 1 tablet (10 mg) by mouth once daily. 90 tablet 1    [DISCONTINUED] hydroCHLOROthiazide (HYDRODiuril) 25 mg tablet TAKE 1 TABLET BY MOUTH EVERY DAY 90 tablet 3    [DISCONTINUED] losartan (Cozaar) 100 mg tablet TAKE 1 TABLET BY MOUTH EVERY DAY 90 tablet 1     No current facility-administered medications on file prior to visit.   [2] No Known Allergies

## 2025-08-01 ENCOUNTER — PREP FOR PROCEDURE (OUTPATIENT)
Dept: OTOLARYNGOLOGY | Facility: HOSPITAL | Age: 70
End: 2025-08-01

## 2025-08-01 ENCOUNTER — APPOINTMENT (OUTPATIENT)
Dept: OTOLARYNGOLOGY | Facility: CLINIC | Age: 70
End: 2025-08-01
Payer: MEDICARE

## 2025-08-01 VITALS — HEIGHT: 73 IN | BODY MASS INDEX: 29.29 KG/M2 | TEMPERATURE: 95 F | WEIGHT: 221 LBS

## 2025-08-01 DIAGNOSIS — H90.3 SENSORINEURAL HEARING LOSS (SNHL) OF BOTH EARS: ICD-10-CM

## 2025-08-01 DIAGNOSIS — G47.33 OBSTRUCTIVE SLEEP APNEA: Primary | ICD-10-CM

## 2025-08-01 DIAGNOSIS — H93.13 TINNITUS OF BOTH EARS: ICD-10-CM

## 2025-08-01 PROCEDURE — 3008F BODY MASS INDEX DOCD: CPT | Performed by: OTOLARYNGOLOGY

## 2025-08-01 PROCEDURE — 99214 OFFICE O/P EST MOD 30 MIN: CPT | Performed by: OTOLARYNGOLOGY

## 2025-08-01 PROCEDURE — 1036F TOBACCO NON-USER: CPT | Performed by: OTOLARYNGOLOGY

## 2025-08-01 PROCEDURE — 1159F MED LIST DOCD IN RCRD: CPT | Performed by: OTOLARYNGOLOGY

## 2025-08-01 NOTE — PROGRESS NOTES
Chief Complaint   Patient presents with    Follow-up     POST OP DISE 7/17, INSPIRE APPROVED      Date of Evaluation: 8/1/2025   Kartik was seen today for follow-up.  Diagnoses and all orders for this visit:  Obstructive sleep apnea (Primary)  Sensorineural hearing loss (SNHL) of both ears  Tinnitus of both ears           PLAN  I have recommended proceeding with implantation of hypoglossal nerve stimulator (inspire) and chest wall respiratory sensor lead.  I discussed the surgery in great detail including the risks of bleeding infection pneumothorax hypoglossal nerve injury persistent apnea battery life MRI restrictions etc.  The patient understands the risks and benefits and would like to proceed with scheduling.      HPI  He returns status post drug-induced sleep endoscopy July 17, 2025.  His anatomy was favorable to consider inspire for treatment of obstructive sleep apnea.  He had a sleep study on April 9, 2025 that showed an AHI 3% of 32 and AHI 4% 23.  Oxygen sanjeev 78.9%.  I started him on AutoPap 8-18.  He tried CPAP and could not tolerate it at all.  He tried wearing it while he was awake watching television and he could just not adjust to something being on his face.  He had claustrophobia and anxiety with it.  He would like to consider inspire.  He does have nasal obstruction.  He has been using Vicks on a nightly basis for the past year.  Kartik Marquez is a 69 y.o. male with bilateral constant monotonal tinnitus.  He has a history of noise exposure through work with the engine sound closest to the left ear.  He denies aural fullness.  His audiogram shows bilateral moderate to severe sensorineural hearing loss with good word recognition and normal tympanometry.  A second issue is 1 of snoring and possible sleep apnea.  He has a history of septoplasty and UPPP years ago.  This helped temporarily.  He had a return of snoring and he tried a dental appliance which helped temporarily and he has had a return of  "snoring.  He does not sleep well.  He wakes up very tired.  He has a history of multiple myeloma.    Physical Exam:  Last Recorded Vitals  Temperature 35 °C (95 °F), height 1.854 m (6' 1\"), weight 100 kg (221 lb). , Body mass index is 29.16 kg/m².  []General appearance: Well-developed, well-nourished in no acute distress, conversant with normal voice quality    Head/face: No erythema or edema or facial tenderness, and normal facial nerve function bilaterally    External ear: Clear external auditory canals with normal pinnae  Tube status: N/A  Middle ear: Tympanic membranes intact and mobile, middle ears normal.  Tympanic membrane perforation: N/A  Mastoid bowl: N/A  Hearing: Normal conversational awareness at normal speech thresholds    Nose visualized using: Anterior rhinoscopy  Nasal dorsum: Nontraumatic midline appearance  Septum: Midline, nonobstructing  Inferior turbinates: Normal, pink  Secretions: Dry    Oral cavity and oropharynx: Normal  Teeth: Good condition  Floor of mouth: without lesions  Palate: Normal hard palate, soft palate and status post UPPP  Oropharynx: Clear, no lesions present  Buccal mucosa: Normal without masses or lesions  Lips: Normal    Nasopharynx: Inadequate mirror exam secondary to gag/anatomy    Neck:  Salivary glands: Normal bilateral parotid and submandibular glands by inspection and palpation.  Non-thyroid masses: No palpable masses or significant lymphadenopathy  Trachea: Midline  Thyroid: No thyromegaly or palpable nodules  Temporomandibular joint: Nontender  Cervical range of motion: Normal    Neurologic exam: Alert and oriented x3, appropriate affect.  Cranial nerves II-XII normal bilaterally  Extraocular movement: Extraocular movement intact, normal gaze alignment     Past Medical History:   Diagnosis Date    Hypertension     Malignant (primary) neoplasm, unspecified (Multi)     Cancer    Other conditions influencing health status     Overexertion From Lifting    Pain in " unspecified limb     Limb pain    Personal history of other diseases of the circulatory system     History of hypertension    Personal history of other diseases of the musculoskeletal system and connective tissue     History of low back pain    Personal history of other infectious and parasitic diseases     History of herpes zoster    Radiculopathy, lumbosacral region     Lumbosacral neuritis    Sleep apnea 6/01/2025    Sprain of ligaments of lumbar spine, initial encounter     Low back sprain      Past Surgical History:   Procedure Laterality Date    NASAL SEPTUM SURGERY      OTHER SURGICAL HISTORY  09/26/2013    Uvulectomy    OTHER SURGICAL HISTORY  07/17/2025    DISE    SHOULDER SURGERY  09/26/2013    Shoulder Surgery    TONSILLECTOMY  09/26/2013    Tonsillectomy    VASECTOMY            Medications:   Current Outpatient Medications   Medication Instructions    amLODIPine (NORVASC) 10 mg, oral, Daily    aspirin 81 mg, Daily    finasteride (PROSCAR) 5 mg, oral, Daily    fluticasone (Flonase) 50 mcg/actuation nasal spray 2 sprays each nostril daily.  Shake gently. Before first use, prime pump. After use, clean tip and replace cap.    gabapentin (NEURONTIN) 300 mg, 3 times daily    hydroCHLOROthiazide (HYDRODIURIL) 25 mg, oral, Daily    Klor-Con M20 20 mEq ER tablet 30 mEq, oral, Daily    lenalidomide (REVLIMID) 5 mg, oral, Daily, Take whole with water. Do not break, chew, or open.    levothyroxine (SYNTHROID, LEVOXYL) 25 mcg, oral, Daily    losartan (COZAAR) 100 mg, oral, Daily    multivit-min/ferrous fumarate (MULTI VITAMIN ORAL) Take by mouth.    omeprazole (PRILOSEC) 20 mg, oral, Daily before breakfast, Do not crush or chew.        Allergies:  No Known Allergies       Rebel Connell MD

## 2025-08-06 ENCOUNTER — LAB (OUTPATIENT)
Dept: LAB | Facility: HOSPITAL | Age: 70
End: 2025-08-06
Payer: MEDICARE

## 2025-08-06 DIAGNOSIS — C90.01 MULTIPLE MYELOMA IN REMISSION (MULTI): ICD-10-CM

## 2025-08-06 LAB
ALBUMIN SERPL BCP-MCNC: 3.5 G/DL (ref 3.4–5)
ALP SERPL-CCNC: 61 U/L (ref 33–136)
ALT SERPL W P-5'-P-CCNC: 21 U/L (ref 10–52)
ANION GAP SERPL CALC-SCNC: 8 MMOL/L (ref 10–20)
AST SERPL W P-5'-P-CCNC: 23 U/L (ref 9–39)
BASOPHILS # BLD AUTO: 0.03 X10*3/UL (ref 0–0.1)
BASOPHILS NFR BLD AUTO: 1 %
BILIRUB SERPL-MCNC: 1 MG/DL (ref 0–1.2)
BUN SERPL-MCNC: 13 MG/DL (ref 6–23)
CALCIUM SERPL-MCNC: 8.6 MG/DL (ref 8.6–10.3)
CHLORIDE SERPL-SCNC: 106 MMOL/L (ref 98–107)
CHOLEST SERPL-MCNC: 152 MG/DL (ref 0–199)
CHOLESTEROL/HDL RATIO: 2.6
CO2 SERPL-SCNC: 28 MMOL/L (ref 21–32)
CREAT SERPL-MCNC: 0.95 MG/DL (ref 0.5–1.3)
EGFRCR SERPLBLD CKD-EPI 2021: 87 ML/MIN/1.73M*2
EOSINOPHIL # BLD AUTO: 0.09 X10*3/UL (ref 0–0.7)
EOSINOPHIL NFR BLD AUTO: 3.1 %
ERYTHROCYTE [DISTWIDTH] IN BLOOD BY AUTOMATED COUNT: 13 % (ref 11.5–14.5)
EST. AVERAGE GLUCOSE BLD GHB EST-MCNC: 97 MG/DL
GLUCOSE SERPL-MCNC: 91 MG/DL (ref 74–99)
HBA1C MFR BLD: 5 % (ref ?–5.7)
HCT VFR BLD AUTO: 40.5 % (ref 41–52)
HCV AB SER QL: NONREACTIVE
HDLC SERPL-MCNC: 58.4 MG/DL
HGB BLD-MCNC: 13.9 G/DL (ref 13.5–17.5)
IGA SERPL-MCNC: 71 MG/DL (ref 70–400)
IGG SERPL-MCNC: 1930 MG/DL (ref 700–1600)
IGM SERPL-MCNC: 38 MG/DL (ref 40–230)
IMM GRANULOCYTES # BLD AUTO: 0.01 X10*3/UL (ref 0–0.7)
IMM GRANULOCYTES NFR BLD AUTO: 0.3 % (ref 0–0.9)
LDLC SERPL CALC-MCNC: 82 MG/DL
LYMPHOCYTES # BLD AUTO: 0.69 X10*3/UL (ref 1.2–4.8)
LYMPHOCYTES NFR BLD AUTO: 23.9 %
MCH RBC QN AUTO: 33.4 PG (ref 26–34)
MCHC RBC AUTO-ENTMCNC: 34.3 G/DL (ref 32–36)
MCV RBC AUTO: 97 FL (ref 80–100)
MONOCYTES # BLD AUTO: 0.38 X10*3/UL (ref 0.1–1)
MONOCYTES NFR BLD AUTO: 13.1 %
NEUTROPHILS # BLD AUTO: 1.69 X10*3/UL (ref 1.2–7.7)
NEUTROPHILS NFR BLD AUTO: 58.6 %
NON HDL CHOLESTEROL: 94 MG/DL (ref 0–149)
NRBC BLD-RTO: ABNORMAL /100{WBCS}
PLATELET # BLD AUTO: 122 X10*3/UL (ref 150–450)
POTASSIUM SERPL-SCNC: 3.3 MMOL/L (ref 3.5–5.3)
PROT SERPL-MCNC: 7.1 G/DL (ref 6.4–8.2)
PROT SERPL-MCNC: 7.4 G/DL (ref 6.4–8.2)
RBC # BLD AUTO: 4.16 X10*6/UL (ref 4.5–5.9)
RBC MORPH BLD: NORMAL
SODIUM SERPL-SCNC: 139 MMOL/L (ref 136–145)
TRIGL SERPL-MCNC: 59 MG/DL (ref 0–149)
TSH SERPL-ACNC: 3.42 MIU/L (ref 0.44–3.98)
VLDL: 12 MG/DL (ref 0–40)
WBC # BLD AUTO: 2.9 X10*3/UL (ref 4.4–11.3)

## 2025-08-06 PROCEDURE — 84155 ASSAY OF PROTEIN SERUM: CPT | Mod: GEALAB

## 2025-08-06 PROCEDURE — 84443 ASSAY THYROID STIM HORMONE: CPT | Performed by: STUDENT IN AN ORGANIZED HEALTH CARE EDUCATION/TRAINING PROGRAM

## 2025-08-06 PROCEDURE — 85025 COMPLETE CBC W/AUTO DIFF WBC: CPT

## 2025-08-06 PROCEDURE — 84165 PROTEIN E-PHORESIS SERUM: CPT | Performed by: STUDENT IN AN ORGANIZED HEALTH CARE EDUCATION/TRAINING PROGRAM

## 2025-08-06 PROCEDURE — 86803 HEPATITIS C AB TEST: CPT | Mod: GEALAB | Performed by: STUDENT IN AN ORGANIZED HEALTH CARE EDUCATION/TRAINING PROGRAM

## 2025-08-06 PROCEDURE — 84155 ASSAY OF PROTEIN SERUM: CPT

## 2025-08-06 PROCEDURE — 82784 ASSAY IGA/IGD/IGG/IGM EACH: CPT | Mod: GEALAB

## 2025-08-06 PROCEDURE — 84165 PROTEIN E-PHORESIS SERUM: CPT | Mod: GEALAB

## 2025-08-06 PROCEDURE — 83521 IG LIGHT CHAINS FREE EACH: CPT | Mod: GEALAB

## 2025-08-06 PROCEDURE — 83036 HEMOGLOBIN GLYCOSYLATED A1C: CPT | Mod: GEALAB | Performed by: STUDENT IN AN ORGANIZED HEALTH CARE EDUCATION/TRAINING PROGRAM

## 2025-08-06 PROCEDURE — 80061 LIPID PANEL: CPT | Performed by: STUDENT IN AN ORGANIZED HEALTH CARE EDUCATION/TRAINING PROGRAM

## 2025-08-07 LAB
KAPPA LC SERPL-MCNC: 1.69 MG/DL (ref 0.33–1.94)
KAPPA LC/LAMBDA SER: 1.28 {RATIO} (ref 0.26–1.65)
LAMBDA LC SERPL-MCNC: 1.32 MG/DL (ref 0.57–2.63)

## 2025-08-08 ENCOUNTER — TELEPHONE (OUTPATIENT)
Facility: CLINIC | Age: 70
End: 2025-08-08
Payer: MEDICARE

## 2025-08-11 ENCOUNTER — APPOINTMENT (OUTPATIENT)
Dept: PREADMISSION TESTING | Facility: HOSPITAL | Age: 70
End: 2025-08-11
Payer: MEDICARE

## 2025-08-11 DIAGNOSIS — I70.90 ATHEROSCLEROSIS: ICD-10-CM

## 2025-08-11 DIAGNOSIS — R42 DIZZINESS AFTER EXTENSION OF NECK: Primary | ICD-10-CM

## 2025-08-11 DIAGNOSIS — I10 PRIMARY HYPERTENSION: ICD-10-CM

## 2025-08-11 LAB
ALBUMIN: 3.9 G/DL (ref 3.4–5)
ALPHA 1 GLOBULIN: 0.3 G/DL (ref 0.2–0.6)
ALPHA 2 GLOBULIN: 0.7 G/DL (ref 0.4–1.1)
BETA GLOBULIN: 0.7 G/DL (ref 0.5–1.2)
GAMMA GLOBULIN: 1.8 G/DL (ref 0.5–1.4)
M-PROTEIN 1: 1.1 G/DL (ref ?–0)
PATH REVIEW-SERUM PROTEIN ELECTROPHORESIS: ABNORMAL
PROTEIN ELECTROPHORESIS COMMENT: ABNORMAL

## 2025-08-25 ENCOUNTER — TELEPHONE (OUTPATIENT)
Dept: HEMATOLOGY/ONCOLOGY | Facility: CLINIC | Age: 70
End: 2025-08-25
Payer: MEDICARE

## 2025-08-27 ENCOUNTER — PRE-ADMISSION TESTING (OUTPATIENT)
Dept: PREADMISSION TESTING | Facility: HOSPITAL | Age: 70
End: 2025-08-27
Payer: MEDICARE

## 2025-08-27 VITALS
WEIGHT: 214 LBS | TEMPERATURE: 97.5 F | DIASTOLIC BLOOD PRESSURE: 83 MMHG | HEART RATE: 64 BPM | OXYGEN SATURATION: 98 % | BODY MASS INDEX: 28.36 KG/M2 | HEIGHT: 73 IN | RESPIRATION RATE: 16 BRPM | SYSTOLIC BLOOD PRESSURE: 147 MMHG

## 2025-08-27 DIAGNOSIS — Z01.818 PREOPERATIVE TESTING: Primary | ICD-10-CM

## 2025-08-27 PROCEDURE — 99204 OFFICE O/P NEW MOD 45 MIN: CPT | Performed by: NURSE PRACTITIONER

## 2025-08-27 PROCEDURE — 87081 CULTURE SCREEN ONLY: CPT | Mod: TRILAB

## 2025-08-27 RX ORDER — CHLORHEXIDINE GLUCONATE ORAL RINSE 1.2 MG/ML
SOLUTION DENTAL
Qty: 473 ML | Refills: 0 | Status: SHIPPED | OUTPATIENT
Start: 2025-08-27

## 2025-08-27 ASSESSMENT — DUKE ACTIVITY SCORE INDEX (DASI)
CAN YOU DO HEAVY WORK AROUND THE HOUSE LIKE SCRUBBING FLOORS OR LIFTING AND MOVING HEAVY FURNITURE: YES
CAN YOU TAKE CARE OF YOURSELF (EAT, DRESS, BATHE, OR USE TOILET): YES
TOTAL_SCORE: 50.7
CAN YOU PARTICIPATE IN MODERATE RECREATIONAL ACTIVITIES LIKE GOLF, BOWLING, DANCING, DOUBLES TENNIS OR THROWING A BASEBALL OR FOOTBALL: YES
CAN YOU PARTICIPATE IN STRENOUS SPORTS LIKE SWIMMING, SINGLES TENNIS, FOOTBALL, BASKETBALL, OR SKIING: NO
CAN YOU RUN A SHORT DISTANCE: YES
DASI METS SCORE: 9
CAN YOU WALK A BLOCK OR TWO ON LEVEL GROUND: YES
CAN YOU CLIMB A FLIGHT OF STAIRS OR WALK UP A HILL: YES
CAN YOU WALK INDOORS, SUCH AS AROUND YOUR HOUSE: YES
CAN YOU DO MODERATE WORK AROUND THE HOUSE LIKE VACUUMING, SWEEPING FLOORS OR CARRYING GROCERIES: YES
CAN YOU DO YARD WORK LIKE RAKING LEAVES, WEEDING OR PUSHING A MOWER: YES
CAN YOU DO LIGHT WORK AROUND THE HOUSE LIKE DUSTING OR WASHING DISHES: YES
CAN YOU HAVE SEXUAL RELATIONS: YES

## 2025-08-27 ASSESSMENT — ENCOUNTER SYMPTOMS
CARDIOVASCULAR NEGATIVE: 1
GASTROINTESTINAL NEGATIVE: 1
MUSCULOSKELETAL NEGATIVE: 1
RESPIRATORY NEGATIVE: 1
NEUROLOGICAL NEGATIVE: 1
FATIGUE: 1
ACTIVITY CHANGE: 1
PSYCHIATRIC NEGATIVE: 1
EYES NEGATIVE: 1

## 2025-08-28 ENCOUNTER — TELEPHONE (OUTPATIENT)
Dept: HEMATOLOGY/ONCOLOGY | Facility: CLINIC | Age: 70
End: 2025-08-28
Payer: MEDICARE

## 2025-08-28 ENCOUNTER — APPOINTMENT (OUTPATIENT)
Dept: PREADMISSION TESTING | Facility: HOSPITAL | Age: 70
End: 2025-08-28
Payer: MEDICARE

## 2025-08-29 LAB — STAPHYLOCOCCUS SPEC CULT: NORMAL

## 2025-09-16 ENCOUNTER — APPOINTMENT (OUTPATIENT)
Dept: UROLOGY | Facility: CLINIC | Age: 70
End: 2025-09-16
Payer: MEDICARE

## 2025-09-26 ENCOUNTER — APPOINTMENT (OUTPATIENT)
Dept: OTOLARYNGOLOGY | Facility: CLINIC | Age: 70
End: 2025-09-26
Payer: MEDICARE

## 2026-07-28 ENCOUNTER — APPOINTMENT (OUTPATIENT)
Facility: CLINIC | Age: 71
End: 2026-07-28
Payer: MEDICARE

## (undated) DEVICE — ASCOPE 4, RHINOLARYNGO SLIM, SINGLE USE, STERILE

## (undated) DEVICE — LUBRICANT, SURGILUBE, STERILE, 2OZ